# Patient Record
Sex: FEMALE
[De-identification: names, ages, dates, MRNs, and addresses within clinical notes are randomized per-mention and may not be internally consistent; named-entity substitution may affect disease eponyms.]

---

## 2017-10-15 ENCOUNTER — HEALTH MAINTENANCE LETTER (OUTPATIENT)
Age: 32
End: 2017-10-15

## 2019-04-17 ENCOUNTER — HOSPITAL ENCOUNTER (OUTPATIENT)
Dept: CARDIOLOGY | Facility: HOSPITAL | Age: 34
Discharge: HOME | End: 2019-04-17

## 2019-06-25 ENCOUNTER — OFFICE VISIT CONVERTED (OUTPATIENT)
Dept: CARDIOLOGY | Facility: CLINIC | Age: 34
End: 2019-06-25
Attending: SPECIALIST

## 2019-11-19 ENCOUNTER — HOSPITAL ENCOUNTER (OUTPATIENT)
Dept: URGENT CARE | Facility: CLINIC | Age: 34
Discharge: HOME | End: 2019-11-19
Attending: PHYSICIAN ASSISTANT

## 2020-02-18 ENCOUNTER — OFFICE VISIT CONVERTED (OUTPATIENT)
Dept: FAMILY MEDICINE CLINIC | Facility: CLINIC | Age: 35
End: 2020-02-18
Attending: NURSE PRACTITIONER

## 2020-03-20 ENCOUNTER — HOSPITAL ENCOUNTER (OUTPATIENT)
Dept: URGENT CARE | Facility: CLINIC | Age: 35
Discharge: HOME | End: 2020-03-20
Attending: PHYSICIAN ASSISTANT

## 2020-03-22 LAB — BACTERIA SPEC AEROBE CULT: NORMAL

## 2020-05-19 ENCOUNTER — HOSPITAL ENCOUNTER (OUTPATIENT)
Dept: LAB | Facility: HOSPITAL | Age: 35
Discharge: HOME | End: 2020-05-19
Attending: NURSE PRACTITIONER

## 2020-05-19 ENCOUNTER — OFFICE VISIT CONVERTED (OUTPATIENT)
Dept: FAMILY MEDICINE CLINIC | Facility: CLINIC | Age: 35
End: 2020-05-19
Attending: NURSE PRACTITIONER

## 2020-05-19 LAB
ALBUMIN SERPL-MCNC: 3.9 G/DL (ref 3.5–5)
ALBUMIN/GLOB SERPL: 1.2 {RATIO} (ref 1.4–2.6)
ALP SERPL-CCNC: 57 U/L (ref 42–98)
ALT SERPL-CCNC: 23 U/L (ref 10–40)
ANION GAP SERPL CALC-SCNC: 18 MMOL/L (ref 8–19)
AST SERPL-CCNC: 18 U/L (ref 15–50)
BASOPHILS # BLD AUTO: 0.06 10*3/UL (ref 0–0.2)
BASOPHILS NFR BLD AUTO: 0.6 % (ref 0–3)
BILIRUB SERPL-MCNC: 0.16 MG/DL (ref 0.2–1.3)
BUN SERPL-MCNC: 13 MG/DL (ref 5–25)
BUN/CREAT SERPL: 17 {RATIO} (ref 6–20)
CALCIUM SERPL-MCNC: 9.1 MG/DL (ref 8.7–10.4)
CHLORIDE SERPL-SCNC: 102 MMOL/L (ref 99–111)
CHOLEST SERPL-MCNC: 195 MG/DL (ref 107–200)
CHOLEST/HDLC SERPL: 3.9 {RATIO} (ref 3–6)
CONV ABS IMM GRAN: 0.03 10*3/UL (ref 0–0.2)
CONV CO2: 21 MMOL/L (ref 22–32)
CONV IMMATURE GRAN: 0.3 % (ref 0–1.8)
CONV TOTAL PROTEIN: 7.1 G/DL (ref 6.3–8.2)
CREAT UR-MCNC: 0.75 MG/DL (ref 0.5–0.9)
DEPRECATED RDW RBC AUTO: 44.4 FL (ref 36.4–46.3)
EOSINOPHIL # BLD AUTO: 0.33 10*3/UL (ref 0–0.7)
EOSINOPHIL # BLD AUTO: 3.6 % (ref 0–7)
ERYTHROCYTE [DISTWIDTH] IN BLOOD BY AUTOMATED COUNT: 13.1 % (ref 11.7–14.4)
GFR SERPLBLD BASED ON 1.73 SQ M-ARVRAT: >60 ML/MIN/{1.73_M2}
GLOBULIN UR ELPH-MCNC: 3.2 G/DL (ref 2–3.5)
GLUCOSE SERPL-MCNC: 80 MG/DL (ref 65–99)
HCT VFR BLD AUTO: 43.7 % (ref 37–47)
HDLC SERPL-MCNC: 50 MG/DL (ref 40–60)
HGB BLD-MCNC: 14 G/DL (ref 12–16)
LDLC SERPL CALC-MCNC: 126 MG/DL (ref 70–100)
LYMPHOCYTES # BLD AUTO: 2.14 10*3/UL (ref 1–5)
LYMPHOCYTES NFR BLD AUTO: 23.1 % (ref 20–45)
MCH RBC QN AUTO: 29.5 PG (ref 27–31)
MCHC RBC AUTO-ENTMCNC: 32 G/DL (ref 33–37)
MCV RBC AUTO: 92.2 FL (ref 81–99)
MONOCYTES # BLD AUTO: 0.64 10*3/UL (ref 0.2–1.2)
MONOCYTES NFR BLD AUTO: 6.9 % (ref 3–10)
NEUTROPHILS # BLD AUTO: 6.05 10*3/UL (ref 2–8)
NEUTROPHILS NFR BLD AUTO: 65.5 % (ref 30–85)
NRBC CBCN: 0 % (ref 0–0.7)
OSMOLALITY SERPL CALC.SUM OF ELEC: 283 MOSM/KG (ref 273–304)
PLATELET # BLD AUTO: 282 10*3/UL (ref 130–400)
PMV BLD AUTO: 11.1 FL (ref 9.4–12.3)
POTASSIUM SERPL-SCNC: 3.9 MMOL/L (ref 3.5–5.3)
RBC # BLD AUTO: 4.74 10*6/UL (ref 4.2–5.4)
SODIUM SERPL-SCNC: 137 MMOL/L (ref 135–147)
TRIGL SERPL-MCNC: 96 MG/DL (ref 40–150)
TSH SERPL-ACNC: 1.89 M[IU]/L (ref 0.27–4.2)
VLDLC SERPL-MCNC: 19 MG/DL (ref 5–37)
WBC # BLD AUTO: 9.25 10*3/UL (ref 4.8–10.8)

## 2020-08-19 ENCOUNTER — OFFICE VISIT CONVERTED (OUTPATIENT)
Dept: FAMILY MEDICINE CLINIC | Facility: CLINIC | Age: 35
End: 2020-08-19
Attending: NURSE PRACTITIONER

## 2020-11-18 ENCOUNTER — OFFICE VISIT CONVERTED (OUTPATIENT)
Dept: FAMILY MEDICINE CLINIC | Facility: CLINIC | Age: 35
End: 2020-11-18
Attending: NURSE PRACTITIONER

## 2021-02-17 ENCOUNTER — OFFICE VISIT CONVERTED (OUTPATIENT)
Dept: FAMILY MEDICINE CLINIC | Facility: CLINIC | Age: 36
End: 2021-02-17
Attending: NURSE PRACTITIONER

## 2021-02-17 ENCOUNTER — CONVERSION ENCOUNTER (OUTPATIENT)
Dept: FAMILY MEDICINE CLINIC | Facility: CLINIC | Age: 36
End: 2021-02-17

## 2021-02-17 LAB
AMPHET UR QL CFM: POSITIVE
BARBITURATES UR QL: NEGATIVE
BENZODIAZ UR QL SCN: NEGATIVE
CONV AMP/METHAMP UR: NEGATIVE
CONV COCAINE, UR: NEGATIVE
MDMA UR QL SCN: NEGATIVE
METHADONE UR QL SCN: NEGATIVE
OPIATES UR QL SCN: NEGATIVE
OXYCODONE UR QL SCN: NEGATIVE
PCP UR QL: NEGATIVE
THC SERPLBLD CFM-MCNC: NEGATIVE NG/ML

## 2021-05-13 NOTE — PROGRESS NOTES
Progress Note      Patient Name: Laura Ordaz   Patient ID: [id number]   Sex: Female   YOB: 1985    Primary Care Provider: Leny MAJOR   Referring Provider: Leny MAJOR    Visit Date: August 19, 2020    Provider: MORIAH Hanna   Location: [facility]   Location Address: [address] [place] [address], [address]  [place]  [id number]   Location Phone: [phone]          Chief Complaint  · Follow up - ADHD, Anxiety      History Of Present Illness  Laura Ordaz is a 35 year old /White female who presents for evaluation and treatment of:      Routine 3 month follow up - ADHD, Anxiety for medication refills.    ADHD:  Takes Adderall with good control of symptoms.  Patient states most days she does very well on current dose.  She does feel like she needs a little extra on some days d/t her job. 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 She has alot of stress with taking care of her patients. She also just started classes at [employer] for her ADN in nursing.     Anxiety:  Takes Vistaril with good control of symptoms.    UDS:  5/19/20  Taiwo:  7/28/20       Past Medical History  Disease Name Date Onset Notes   Allergic rhinitis, chronic --  --    Asthma --  --    Mood disorder --  --          Past Surgical History  Procedure Name Date Notes   Cyst Removal --  --    [place] Tooth Extraction --  --          Medication List  Name Date Started Instructions   dextroamphetamine-amphetamine 30 mg oral tablet 08/19/2020 take 1 tablet (30 mg) by oral route 2 times per day before breakfast and at 2 pm for 30 days   naproxen 250 mg oral tablet  take 1 tablet (250 mg) by oral route every 6 hours as needed with food   Sprintec (28) 0.25-35 mg-mcg oral tablet 08/19/2020 take 1 tablet by oral route once daily for 30 days   trazodone 50 mg oral tablet  take 1 tablet (50 mg) by oral route once daily at bedtime   Vistaril 50 mg oral capsule 02/18/2020 one po daily as  "needed         Allergy List  Allergen Name Date Reaction Notes   Nickel allergy --  --  --    NO KNOWN DRUG ALLERGIES --  --  --        Allergies Reconciled  Family Medical History  Disease Name Relative/Age Notes   Family history of colon cancer  --    Family history of breast cancer  --    Family history of lung cancer  --    Family history of heart disease  --    Family history of hypertension  --    Family History of Leukemia  --    Family history of diabetes mellitus (DM)  --    Family history of Jordyn Gehrig's disease  --          Social History  Finding Status Start/Stop Quantity Notes   Alcohol Current some day --/-- --  drinks weekly; wine and beer   Caffeine Current every day --/-- --  drinks regularly; coffee, tea and soft drinks; 1-2 times per day   Second hand smoke exposure Current some day --/-- --  yes   Tobacco Former 11/27 1 ppd --          Immunizations  NameDate Admin Mfg Trade Name Lot Number Route Inj VIS Given VIS Publication   [id number]/10/2019 SKB Fluzone Quadrivalent  NE NE 08/19/2020    Comments:          Review of Systems  · Constitutional  o Denies  o : fatigue  · Eyes  o Denies  o : blurred vision, changes in vision  · HENT  o Denies  o : headaches  · Cardiovascular  o Denies  o : chest pain, irregular heart beats, rapid heart rate, dyspnea on exertion  · Respiratory  o Denies  o : shortness of breath, wheezing, cough  · Gastrointestinal  o Denies  o : nausea, vomiting, diarrhea, constipation, abdominal pain, blood in stools, melena  · Genitourinary  o Denies  o : frequency, dysuria, hematuria  · Integument  o Denies  o : rash, new skin lesions  · Musculoskeletal  o Denies  o : joint pain, joint swelling, muscle pain  · Endocrine  o Denies  o : polyuria, polydipsia      Vitals  Date Time BP Position Site L\R Cuff Size HR RR TEMP (F) WT  HT  BMI kg/m2 BSA m2 O2 Sat HC       02/18/2020 12:52 /71 Sitting    99 - R   221lbs 0oz 5'  9\" 32.64 2.21 97 %    05/19/2020 08:59 /89 " "Sitting    106 - R  98.1 228lbs 0oz 5'  9\" 33.67 2.24 99 %    08/19/2020 09:34 /78 Sitting    93 - R  97.7 229lbs 0oz 5'  9\" 33.82 2.25 100 %          Physical Examination  · Constitutional  o Appearance  o : well developed, well-nourished, no acute distress  · Head and Face  o Head  o : normocephalic, atraumatic  · Neck  o Inspection/Palpation  o : normal appearance, no masses or tenderness, trachea midline  o Thyroid  o : gland size normal, nontender, no nodules or masses present on palpation  · Respiratory  o Respiratory Effort  o : breathing unlabored  o Inspection of Chest  o : chest rise symmetric bilaterally  o Auscultation of Lungs  o : clear to auscultation bilaterally throughout inspiration and expiration  · Cardiovascular  o Heart  o :   § Auscultation of Heart  § : regular rate and rhythm, no murmurs, gallops or rubs  o Peripheral Vascular System  o :   § Extremities  § : no edema  · Lymphatic  o Neck  o : no cervical lymphadenopathy, no supraclavicular lymphadenopathy  · Psychiatric  o Mood and Affect  o : mood normal, affect appropriate          Results  · In-Office Procedures  o Lab procedure  § IOP - Urine Drug Screen In-House [facility] ([address]383)   § Amphetamines Ur Ql: Positive   § Barbiturates Ur Ql: Negative   § Buprenorphine+Nor Ur Ql Scn: Negative   § Benzodiaz Ur Ql: Negative   § Cocaine Ur Ql: Negative   § Methadone Ur Ql: Negative   § Methamphet Ur Ql: Negative   § MDMA Ur Ql Scn: Negative   § Opiates Ur Ql: Negative   § Oxycodone Ur Ql: Negative   § PCP Ur Ql: Negative   § THC Ur Ql: Negative   § Temp in Range?: Within/Acceptable   § Control Seen?: Yes   § Pregnancy test, urine (93559)   § B-HCG Ur Ql: Negative   § Internal Control Verified?: Yes       Assessment  · ADHD (attention deficit hyperactivity disorder)     314.01/F90.9  · Anxiety     300.00/F41.9  · Medication management     V58.69/Z79.899  · Contraceptive management     V25.9/Z30.9    Problems Reconciled  Plan  · Orders  o HERBIE " Report (KASPR) - - 08/19/2020  o ACO-39: Current medications updated and reviewed () - - 08/19/2020  o ACO-14: Influenza immunization administered or previously received () - - 08/19/2020  · Medications  o dextroamphetamine-amphetamine 30 mg oral tablet   SIG: take 1 tablet (30 mg) by oral route 2 times per day before breakfast and at 2 pm for 30 days   DISP: (60) tablet with 0 refills  Refilled on 08/19/2020     o Sprintec (28) 0.25-35 mg-mcg oral tablet   SIG: take 1 tablet by oral route once daily for 30 days   DISP: (3) tablet with 1 refills  Refilled on 08/19/2020     o Medications have been Reconciled  o Transition of Care or Provider Policy  · Instructions  o Obtained a written consent for HERBIE query. Discussed the risk and benefits of the use of controlled substances with the patient, including the risk of tolerance and drug dependence. The patient has been counseled on the need to have an exit strategy, including potentially discontinuing the use of controlled substances. HERBIE has or will be reviewed as soon as it becomes avaliable.  o Patient is taking medications as prescribed and doing well.   o Patient was educated/instructed on their diagnosis, treatment and medications prior to discharge from the clinic today.  o Electronically Identified Patient Education Materials Provided Electronically  · Disposition  o Follow Up in 3 months            Electronically Signed by: MORIAH Hanna -Author on August 20, 2020 07:23:15 PM  Electronically Co-signed by: Sg Cameron DO -Reviewer on August 24, 2020 05:05:37 PM

## 2021-05-13 NOTE — PROGRESS NOTES
Progress Note      Patient Name: Laura Ordaz   Patient ID: [id number]   Sex: Female   YOB: 1985    Primary Care Provider: Leny MAJOR   Referring Provider: Leny MAJOR    Visit Date: May 19, 2020    Provider: MORIAH Hanna   [facility]   Location Address: [address] [place] [address], [address]  [place], [place]  [id number]   Location Phone: [phone]          Chief Complaint  · Pt here for 3 month f/u   · Medication refills  · Annual Physical Exam      History Of Present Illness  Laura Ordaz is a 35 year old /White female who presents for evaluation and treatment of:      Annual Physical Exam    ADD, contraceptive managment    pt here requesting medication refills on  dextroamphetamine-amphetamine  and sprintec.  Immunizations UTD per patient.       Past Medical History  Disease Name Date Onset Notes   Allergic rhinitis, chronic --  --    Asthma --  --    Mood disorder --  --          Past Surgical History  Procedure Name Date Notes   Cyst Removal --  --    [place] Tooth Extraction --  --          Medication List  Name Date Started Instructions   dextroamphetamine-amphetamine 30 mg oral tablet 05/19/2020 take 1 tablet (30 mg) by oral route 2 times per day before breakfast and at 2 pm   naproxen 250 mg oral tablet  take 1 tablet (250 mg) by oral route every 6 hours as needed with food   Sprintec (28) 0.25-35 mg-mcg oral tablet 05/19/2020 take 1 tablet by oral route once daily for 30 days   trazodone 50 mg oral tablet  take 1 tablet (50 mg) by oral route once daily at bedtime   Vistaril 50 mg oral capsule 02/18/2020 one po daily as needed         Allergy List  Allergen Name Date Reaction Notes   Nickel allergy --  --  --    NO KNOWN DRUG ALLERGIES --  --  --          Family Medical History  Disease Name Relative/Age Notes   Family history of colon cancer  --    Family history of breast cancer  --    Family history of lung cancer  --    Family history of heart disease   "--    Family history of hypertension  --    Family History of Leukemia  --    Family history of diabetes mellitus (DM)  --    Family history of Jordyn Gehrig's disease  --          Social History  Finding Status Start/Stop Quantity Notes   Alcohol Current some day --/-- --  drinks weekly; wine and beer   Caffeine Current every day --/-- --  drinks regularly; coffee, tea and soft drinks; 1-2 times per day   Second hand smoke exposure Current some day --/-- --  yes   Tobacco Former 11/27 --  started smoking at age 10; quit smoking at age 26; smoked 15 cigarette(s) per day         Review of Systems  · Constitutional  o Denies  o : fever, fatigue, weight loss, weight gain  · Cardiovascular  o Denies  o : lower extremity edema, claudication, chest pressure, palpitations  · Respiratory  o Denies  o : shortness of breath, wheezing, cough, hemoptysis, dyspnea on exertion  · Gastrointestinal  o Denies  o : nausea, vomiting, diarrhea, constipation, abdominal pain      Vitals  Date Time BP Position Site L\R Cuff Size HR RR TEMP (F) WT  HT  BMI kg/m2 BSA m2 O2 Sat        05/19/2020 08:59 /89 Sitting    106 - R  98.1 228lbs 0oz 5'  9\" 33.67 2.24 99 %          Physical Examination  · Constitutional  o Appearance  o : well developed, well-nourished, no acute distress  · Head and Face  o Head  o : normocephalic, atraumatic  · Neck  o Inspection/Palpation  o : normal appearance, no masses or tenderness, trachea midline  o Thyroid  o : gland size normal, nontender, no nodules or masses present on palpation  · Respiratory  o Respiratory Effort  o : breathing unlabored  o Inspection of Chest  o : chest rise symmetric bilaterally  o Auscultation of Lungs  o : clear to auscultation bilaterally throughout inspiration and expiration  · Cardiovascular  o Heart  o :   § Auscultation of Heart  § : tachycardia present, normal rhythm, no murmurs present, no pericardial friction rub  o Peripheral Vascular System  o :   § Extremities  § : no " edema  · Lymphatic  o Neck  o : no cervical lymphadenopathy, no supraclavicular lymphadenopathy  · Psychiatric  o Mood and Affect  o : mood normal, affect appropriate          Results  · In-Office Procedures  o Lab procedure  § IOP - Urine Drug Screen In-House [facility] ([id number])   § Amphetamines Ur Ql: Positive   § Barbiturates Ur Ql: Negative   § Buprenorphine+Nor Ur Ql Scn: Negative   § Benzodiaz Ur Ql: Negative   § Cocaine Ur Ql: Negative   § Methadone Ur Ql: Negative   § Methamphet Ur Ql: Negative   § MDMA Ur Ql Scn: Negative   § Opiates Ur Ql: Negative   § Oxycodone Ur Ql: Negative   § PCP Ur Ql: Negative   § THC Ur Ql: Negative   § Temp in Range?: Within/Acceptable   § Control Seen?: Yes       Assessment  · Annual physical exam     V70.0/Z00.00  · Screening for lipid disorders     V77.91/Z13.220  · ADD (attention deficit disorder)     314.00/F98.8  continue Adderall 30mg bid. pt doing well on this dose  · Family planning counseling     V25.09/Z30.09  · Contraceptive management     V25.9/Z30.9  refill Sprintec, doing well.  · Routine lab draw     V72.60/Z01.89  · Medication management     V58.69/Z79.899    Problems Reconciled  Plan  · Orders  o Physical, Primary Care Panel (CBC, CMP, Lipid, TSH) [facility] (18690, 95456, 84171, 46661) - V70.0/Z00.00 - 05/19/2020  o HERBIE Report (KASPR) - - 05/19/2020  o ACO-39: Current medications updated and reviewed () - - 05/19/2020  o ACO-14: Influenza immunization administered or previously received () - - 05/19/2020  o ACO-13: Fall Risk Screening with no falls in past year or only one fall without injury in the past year (1101F) - - 05/19/2020  o ACO - Pt declines to or was not able to provide an Advance Care Plan or name a Surrogate Decision Maker (1124F) - - 05/19/2020  · Medications  o Sprintec (28) 0.25-35 mg-mcg oral tablet   SIG: take 1 tablet by oral route once daily for 30 days   DISP: (1) tablet with 11 refills  Refilled on 05/19/2020     o Medications have been  Reconciled  o Transition of Care or Provider Policy  · Instructions  o Reviewed health maintenance flowsheet and updated information. Orders were placed and/or patient's response was documented.  o Obtained a written consent for HERBIE query. Discussed the risk and benefits of the use of controlled substances with the patient, including the risk of tolerance and drug dependence. The patient has been counseled on the need to have an exit strategy, including potentially discontinuing the use of controlled substances. HERBIE has or will be reviewed as soon as it becomes avaliable.  o Patient is taking medications as prescribed and doing well.   o Patient was educated/instructed on their diagnosis, treatment and medications prior to discharge from the clinic today.  o Call the office with any concerns or questions.  o Discussed Covid-19 precautions including, but not limited to, social distancing, avoid touching your face, and hand washing.   · Disposition  o Follow Up in 3 months  · Associate Tasks  o Task ID [id number] ''''Provider to Provider ONLY Controlled Substance Request: dextroamphetamine-amphetamine refill            Electronically Signed by: MORIAH Hanna -Author on May 19, 2020 05:37:11 PM

## 2021-05-13 NOTE — PROGRESS NOTES
Progress Note      Patient Name: Laura Ordaz   Patient ID: [id number]   Sex: Female   YOB: 1985    Primary Care Provider: Leny MAJOR   Referring Provider: Leny MAJOR    Visit Date: November 18, 2020    Provider: MORIAH Hanna   Location: [facility]   Location Address: [address] [place] [address], [address]  [place], [place]  [id number]   Location Phone: [phone]          Chief Complaint  · 3 mo follow up - ADD      History Of Present Illness  Laura Ordaz is a 35 year old /White female who presents for evaluation and treatment of:      Routine 3 month follow up on ADHD for  medication refill.    ADHD: Takes Adderall with good control of symptoms on current dose.  Patient is able to concentrate, stay focused and multi-task with medication. Pt not due for med refill at [facility] today, as she just refilled. She will call when she needs refill.     Taiwo: 11/17/20  UDS: 8/29/20       Past Medical History  Disease Name Date Onset Notes   Allergic rhinitis, chronic --  --    Asthma --  --    Mood disorder --  --          Past Surgical History  Procedure Name Date Notes   Cyst Removal --  --    [place] Tooth Extraction --  --          Medication List  Name Date Started Instructions   dextroamphetamine-amphetamine 30 mg oral tablet 11/06/2020 take 1 tablet (30 mg) by oral route 2 times per day before breakfast and at 2 pm for 30 days   naproxen 250 mg oral tablet  take 1 tablet (250 mg) by oral route every 6 hours as needed with food   Sprintec (28) 0.25-35 mg-mcg oral tablet 08/19/2020 take 1 tablet by oral route once daily for 30 days   trazodone 50 mg oral tablet  take 1 tablet (50 mg) by oral route once daily at bedtime   Vistaril 50 mg oral capsule 02/18/2020 one po daily as needed         Allergy List  Allergen Name Date Reaction Notes   Nickel allergy --  --  --    NO KNOWN DRUG ALLERGIES --  --  --        Allergies Reconciled  Family Medical History  Disease  "Name Relative/Age Notes   Family history of colon cancer  --    Family history of breast cancer  --    Family history of lung cancer  --    Family history of heart disease  --    Family history of hypertension  --    Family History of Leukemia  --    Family history of diabetes mellitus (DM)  --    Family history of Jordyn Gehrig's disease  --          Social History  Finding Status Start/Stop Quantity Notes   Alcohol Current some day --/-- --  drinks weekly; wine and beer   Caffeine Current every day --/-- --  drinks regularly; coffee, tea and soft drinks; 1-2 times per day   Second hand smoke exposure Current some day --/-- --  yes   Tobacco Former 11/27 1 ppd --          Immunizations  NameDate Admin Mfg Trade Name Lot Number Route Inj VIS Given VIS Publication   [id number]/10/2020 SKB Fluzone Quadrivalent  NE NE 11/18/2020    Comments:          Review of Systems  · Constitutional  o Denies  o : fatigue  · Eyes  o Denies  o : blurred vision, changes in vision  · HENT  o Denies  o : headaches  · Cardiovascular  o Denies  o : chest pain, irregular heart beats, rapid heart rate, dyspnea on exertion  · Respiratory  o Denies  o : shortness of breath, wheezing, cough  · Gastrointestinal  o Denies  o : nausea, vomiting, diarrhea, constipation, abdominal pain, blood in stools, melena  · Genitourinary  o Denies  o : frequency, dysuria, hematuria  · Integument  o Denies  o : rash, new skin lesions  · Musculoskeletal  o Denies  o : joint pain, joint swelling, muscle pain  · Endocrine  o Denies  o : polyuria, polydipsia      Vitals  Date Time BP Position Site L\R Cuff Size HR RR TEMP (F) WT  HT  BMI kg/m2 BSA m2 O2 Sat FR L/min FiO2 HC       05/19/2020 08:59 /89 Sitting    106 - R  98.1 228lbs 0oz 5'  9\" 33.67 2.24 99 %      08/19/2020 09:34 /78 Sitting    93 - R  97.7 229lbs 0oz 5'  9\" 33.82 2.25 100 %  21%    11/18/2020 12:52 /56 Sitting    94 - R  97.8 236lbs 4oz 5'  9\" 34.89 2.28 100 %  21%  "         Physical Examination  · Constitutional  o Appearance  o : well developed, well-nourished, no acute distress  · Head and Face  o Head  o : normocephalic, atraumatic  · Respiratory  o Respiratory Effort  o : breathing unlabored  o Inspection of Chest  o : chest rise symmetric bilaterally  o Auscultation of Lungs  o : clear to auscultation bilaterally throughout inspiration and expiration  · Cardiovascular  o Heart  o :   § Auscultation of Heart  § : regular rate and rhythm, no murmurs, gallops or rubs  o Peripheral Vascular System  o :   § Extremities  § : no edema  · Psychiatric  o Mood and Affect  o : mood normal, affect appropriate          Results  · In-Office Procedures  o Lab procedure  § IOP - Urine Drug Screen In-House [facility] ([address])   § Amphetamines Ur Ql: Positive   § Barbiturates Ur Ql: Negative   § Buprenorphine+Nor Ur Ql Scn: Negative   § Benzodiaz Ur Ql: Negative   § Cocaine Ur Ql: Negative   § Methadone Ur Ql: Negative   § Methamphet Ur Ql: Negative   § MDMA Ur Ql Scn: Negative   § Opiates Ur Ql: Negative   § Oxycodone Ur Ql: Negative   § PCP Ur Ql: Negative   § THC Ur Ql: Negative   § Temp in Range?: Within/Acceptable   § Control Seen?: Yes       Assessment  · ADHD (attention deficit hyperactivity disorder)     314.01/F90.9    Problems Reconciled  Plan  · Orders  o HERBIE Report (KASPR) - - 11/18/2020  o ACO-39: Current medications updated and reviewed (, 1159F) - - 11/18/2020  o ACO-14: Influenza immunization administered or previously received [facility] () - - 11/18/2020  · Medications  o Medications have been Reconciled  o Transition of Care or Provider Policy  · Instructions  o Obtained a written consent for HERBIE query. Discussed the risk and benefits of the use of controlled substances with the patient, including the risk of tolerance and drug dependence. The patient has been counseled on the need to have an exit strategy, including potentially discontinuing the use of controlled  substances. HERBIE has or will be reviewed as soon as it becomes avaliable.  o See note for indication of controlled substance. Herbie and drug screen have been reviewed. Controlled substance agreement signed and scanned into chart. After discussion of risks and benefits of medication, I have determined patient is suitable for Rx while demonstrating the ability to safely follow and administer the medication plan. Patient understands the expectation that medication directions cannot be adjusted without a provider's written approval.   o Patient was educated/instructed on their diagnosis, treatment and medications prior to discharge from the clinic today.  o Call the office with any concerns or questions.  o Discussed Covid-19 precautions including, but not limited to, social distancing, avoid touching your face, and hand washing.   o Electronically Identified Patient Education Materials Provided Electronically  · Disposition  o Follow Up in 3 months            Electronically Signed by: MORIAH Hanna -Author on November 18, 2020 02:09:05 PM

## 2021-05-14 VITALS
DIASTOLIC BLOOD PRESSURE: 72 MMHG | BODY MASS INDEX: 34.66 KG/M2 | SYSTOLIC BLOOD PRESSURE: 120 MMHG | TEMPERATURE: 98.2 F | HEIGHT: 69 IN | WEIGHT: 234 LBS | HEART RATE: 109 BPM | OXYGEN SATURATION: 100 %

## 2021-05-14 VITALS
HEIGHT: 69 IN | OXYGEN SATURATION: 100 % | HEART RATE: 94 BPM | DIASTOLIC BLOOD PRESSURE: 56 MMHG | WEIGHT: 236.25 LBS | TEMPERATURE: 97.8 F | SYSTOLIC BLOOD PRESSURE: 116 MMHG | BODY MASS INDEX: 34.99 KG/M2

## 2021-05-14 NOTE — PROGRESS NOTES
Progress Note      Patient Name: Laura Ordaz   Patient ID: [id number]   Sex: Female   YOB: 1985    Primary Care Provider: Leny MAJOR   Referring Provider: Leny MAJOR    Visit Date: February 17, 2021    Provider: MORIAH Hanna   Location: [facility]   Location Address: [address] [place] [address], [address]  [place], [place]  [id number]   Location Phone: [phone]          Chief Complaint  · Follow up ADD      History Of Present Illness  Laura Ordaz is a 35 year old /White female who presents for evaluation and treatment of:      ADD    Patient states that she is here today for a refill on her Adderall for her ADD. Patient states that she feels like the medicine is losing its strength but knows that she can't go any higher due to her insurance not paying of the extended release. She is currently taking 30mg BID. She does not wish to change dosing at this time. She will continue this dose and contact her insurance and see if the XR formula will be covered.            Past Medical History  Disease Name Date Onset Notes   Allergic rhinitis, chronic --  --    Asthma --  --    Mood disorder --  --          Past Surgical History  Procedure Name Date Notes   Cyst Removal --  --    [place] Tooth Extraction --  --          Medication List  Name Date Started Instructions   dextroamphetamine-amphetamine 30 mg oral tablet 01/15/2021 take 1 tablet (30 mg) by oral route 2 times per day before breakfast and at 2 pm for 30 days   naproxen 250 mg oral tablet  take 1 tablet (250 mg) by oral route every 6 hours as needed with food   Sprintec (28) 0.25-35 mg-mcg oral tablet 01/15/2021 take 1 tablet by oral route once daily for 90 days   trazodone 50 mg oral tablet  take 1 tablet (50 mg) by oral route once daily at bedtime   Vistaril 50 mg oral capsule 02/18/2020 one po daily as needed         Allergy List  Allergen Name Date Reaction Notes   Nickel allergy --  --  --    NO  "KNOWN DRUG ALLERGIES --  --  --        Allergies Reconciled  Family Medical History  Disease Name Relative/Age Notes   Family history of colon cancer  --    Family history of breast cancer  --    Family history of lung cancer  --    Family history of heart disease  --    Family history of hypertension  --    Family History of Leukemia  --    Family history of diabetes mellitus (DM)  --    Family history of Jordyn Gehrig's disease  --          Social History  Finding Status Start/Stop Quantity Notes   Alcohol Current some day --/-- --  drinks weekly; wine and beer   Caffeine Current every day --/-- --  drinks regularly; coffee, tea and soft drinks; 1-2 times per day   Second hand smoke exposure Current some day --/-- --  yes   Tobacco Former 11/27 1 ppd --          Immunizations  NameDate Admin Mfg Trade Name Lot Number Route Inj VIS Given VIS Publication   Rquwhihag92/10/2020 SKB Fluzone Quadrivalent  NE NE 11/18/2020    Comments:          Review of Systems  · Constitutional  o Denies  o : fatigue  · Eyes  o Denies  o : blurred vision, changes in vision  · HENT  o Denies  o : headaches  · Cardiovascular  o Denies  o : chest pain, irregular heart beats, rapid heart rate, dyspnea on exertion  · Respiratory  o Denies  o : shortness of breath, wheezing, cough  · Gastrointestinal  o Denies  o : nausea, vomiting, diarrhea, constipation, abdominal pain, blood in stools, melena  · Genitourinary  o Denies  o : frequency, dysuria, hematuria  · Integument  o Denies  o : rash, new skin lesions  · Musculoskeletal  o Denies  o : joint pain, joint swelling, muscle pain  · Endocrine  o Denies  o : polyuria, polydipsia      Vitals  Date Time BP Position Site L\R Cuff Size HR RR TEMP (F) WT  HT  BMI kg/m2 BSA m2 O2 Sat FR L/min FiO2 HC       02/17/2021 12:47 /72 Sitting    109 - R  98.2 233lbs 16oz 5'  9\" 34.56 2.27 100 %  21%          Physical Examination  · Constitutional  o Appearance  o : well developed, well-nourished, no " acute distress  · Head and Face  o Head  o : normocephalic, atraumatic  · Respiratory  o Respiratory Effort  o : breathing unlabored  o Inspection of Chest  o : chest rise symmetric bilaterally  o Auscultation of Lungs  o : clear to auscultation bilaterally throughout inspiration and expiration  · Cardiovascular  o Heart  o :   § Auscultation of Heart  § : regular rate and rhythm, no murmurs, gallops or rubs  o Peripheral Vascular System  o :   § Extremities  § : no edema  · Psychiatric  o Mood and Affect  o : mood normal, affect appropriate          Results  · In-Office Procedures  o Lab procedure  § IOP - Urine Drug Screen In-House [facility] ([address])   § Amphetamines Ur Ql: Positive   § Barbiturates Ur Ql: Negative   § Buprenorphine+Nor Ur Ql Scn: Negative   § Benzodiaz Ur Ql: Negative   § Cocaine Ur Ql: Negative   § Methadone Ur Ql: Negative   § Methamphet Ur Ql: Negative   § MDMA Ur Ql Scn: Negative   § Opiates Ur Ql: Negative   § Oxycodone Ur Ql: Negative   § PCP Ur Ql: Negative   § THC Ur Ql: Negative   § Temp in Range?: Within/Acceptable   § Control Seen?: Yes       Assessment  · Other long term (current) drug therapy     V58.69/Z79.899  · Screening for depression     V79.0/Z13.89  · ADD (attention deficit disorder)     314.00/F98.8    Problems Reconciled  Plan  · Orders  o Annual depression screening, 15 minutes (, 97554) - V79.0/Z13.89 - 02/17/2021  o ACO-18: Negative screen for clinical depression using a standardized tool () - V79.0/Z13.89 - 02/17/2021  o HERBIE Report (KASPR) - V58.69/Z79.899 - 02/17/2021  o ACO-39: Current medications updated and reviewed (1159F, ) - - 02/17/2021  · Medications  o Medications have been Reconciled  o Transition of Care or Provider Policy  · Instructions  o Depression Screen completed and scanned into the EMR under the designated folder within the patient's documents.  o Today's PHQ-9 result is _1__  o Obtained a written consent for HERBIE query. Discussed the  risk and benefits of the use of controlled substances with the patient, including the risk of tolerance and drug dependence. The patient has been counseled on the need to have an exit strategy, including potentially discontinuing the use of controlled substances. HERBIE has or will be reviewed as soon as it becomes avaliable.  o See note for indication of controlled substance. Herbie and drug screen have been reviewed. Controlled substance agreement signed and scanned into chart. After discussion of risks and benefits of medication, I have determined patient is suitable for Rx while demonstrating the ability to safely follow and administer the medication plan. Patient understands the expectation that medication directions cannot be adjusted without a provider's written approval.   o Patient is taking medications as prescribed and doing well.   o Patient was educated/instructed on their diagnosis, treatment and medications prior to discharge from the clinic today.  o Call the office with any concerns or questions.  o Discussed Covid-19 precautions including, but not limited to, social distancing, avoid touching your face, and hand washing.   o Electronically Identified Patient Education Materials Provided Electronically  · Disposition  o Follow Up in 3 months  · Associate Tasks  o Task ID [id number] ''''Provider to Provider ONLY Controlled Substance Request: Adderall rf            Electronically Signed by: MORIAH Hanna -Author on February 17, 2021 01:37:07 PM

## 2021-05-15 VITALS
HEART RATE: 106 BPM | WEIGHT: 228 LBS | BODY MASS INDEX: 33.77 KG/M2 | OXYGEN SATURATION: 99 % | TEMPERATURE: 98.1 F | DIASTOLIC BLOOD PRESSURE: 89 MMHG | SYSTOLIC BLOOD PRESSURE: 127 MMHG | HEIGHT: 69 IN

## 2021-05-15 VITALS
BODY MASS INDEX: 31.99 KG/M2 | SYSTOLIC BLOOD PRESSURE: 122 MMHG | HEART RATE: 100 BPM | DIASTOLIC BLOOD PRESSURE: 88 MMHG | WEIGHT: 216 LBS | HEIGHT: 69 IN

## 2021-05-15 VITALS
TEMPERATURE: 97.7 F | DIASTOLIC BLOOD PRESSURE: 78 MMHG | WEIGHT: 229 LBS | BODY MASS INDEX: 33.92 KG/M2 | OXYGEN SATURATION: 100 % | HEART RATE: 93 BPM | SYSTOLIC BLOOD PRESSURE: 130 MMHG | HEIGHT: 69 IN

## 2021-05-15 VITALS
WEIGHT: 221 LBS | SYSTOLIC BLOOD PRESSURE: 119 MMHG | OXYGEN SATURATION: 97 % | HEIGHT: 69 IN | BODY MASS INDEX: 32.73 KG/M2 | DIASTOLIC BLOOD PRESSURE: 71 MMHG | HEART RATE: 99 BPM

## 2021-05-17 ENCOUNTER — CONVERSION ENCOUNTER (OUTPATIENT)
Dept: FAMILY MEDICINE CLINIC | Facility: CLINIC | Age: 36
End: 2021-05-17

## 2021-05-17 ENCOUNTER — OFFICE VISIT CONVERTED (OUTPATIENT)
Dept: FAMILY MEDICINE CLINIC | Facility: CLINIC | Age: 36
End: 2021-05-17
Attending: NURSE PRACTITIONER

## 2021-05-26 ENCOUNTER — RECORDS - HEALTHEAST (OUTPATIENT)
Dept: ADMINISTRATIVE | Facility: CLINIC | Age: 36
End: 2021-05-26

## 2021-06-05 NOTE — PROGRESS NOTES
Progress Note      Patient Name: Laura Ordaz   Patient ID: [id number]   Sex: Female   YOB: 1985    Primary Care Provider: Leny MAJOR   Referring Provider: Leny MAJOR    Visit Date: May 17, 2021    Provider: MORIAH Hanna   Location: [facility]   Location Address: [address] [place] [address], [address]  [place]  [id number]   Location Phone: [phone]          Chief Complaint  · Follow up - ADD, Insomnia  · annual physical exam      History Of Present Illness  Laura Ordaz is a 36 year old /White female who presents for evaluation and treatment of:      Annual Physical Exam  Routine follow up on ADD, Insomnia for medication refills.    ADD:  Takes Adderal with good control of symptoms.  Patient is able to concentrate and focus on tasks.  Patient does note that it is not working as well as it did, she feels it wearing off at about the assisted [place] before she can take her second dose.  She has been on her current dose approx. 3 years and is interested in possibly going to Adderall XR. She has contacted her insurance to see if they will cover the XR; however she has not heard back from them. For now, she would just like to stay with her current dose, as she does not want to go without the medication.    Taiwo 5/17/21  UDS 5/17/21    Insomnia/Night terrors:  Takes Trazodone, Vistaril PRN with good control of symptoms.  Patient states she is able to fall asleep quickly and stays asleep all night.  She does not remember having night terrors when she wakes up.         Past Medical History  Disease Name Date Onset Notes   Allergic rhinitis, chronic --  --    Asthma --  --    Mood disorder --  --          Past Surgical History  Procedure Name Date Notes   Cyst Removal --  --    [place] Tooth Extraction --  --          Medication List  Name Date Started Instructions   dextroamphetamine-amphetamine 30 mg oral tablet 04/12/2021 take 1 tablet (30 mg) by oral  route 2 times per day before breakfast and at 2 pm for 30 days   naproxen 250 mg oral tablet  take 1 tablet (250 mg) by oral route every 6 hours as needed with food   Sprintec (28) 0.25-35 mg-mcg oral tablet 01/15/2021 take 1 tablet by oral route once daily for 90 days   trazodone 50 mg oral tablet  take 1 tablet (50 mg) by oral route once daily at bedtime   Vistaril 50 mg oral capsule 02/18/2020 one po daily as needed         Allergy List  Allergen Name Date Reaction Notes   Nickel allergy --  --  --    NO KNOWN DRUG ALLERGIES --  --  --        Allergies Reconciled  Family Medical History  Disease Name Relative/Age Notes   Family history of colon cancer  --    Family history of breast cancer  --    Family history of lung cancer  --    Family history of heart disease  --    Family history of hypertension  --    Family History of Leukemia  --    Family history of diabetes mellitus (DM)  --    Family history of Jordyn Gehrig's disease  --          Social History  Finding Status Start/Stop Quantity Notes   Alcohol Current some day --/-- --  drinks weekly; wine and beer   Caffeine Current every day --/-- --  drinks regularly; coffee, tea and soft drinks; 1-2 times per day   Second hand smoke exposure Current some day --/-- --  yes   Tobacco Former 11/27 1 ppd --          Immunizations  NameDate Admin Mfg Trade Name Lot Number Route Inj VIS Given VIS Publication   Lcihmdygf06/10/2020 SKB Fluzone Quadrivalent  NE NE 11/18/2020    Comments:          Review of Systems  · Constitutional  o Denies  o : fatigue  · Eyes  o Denies  o : blurred vision, changes in vision  · HENT  o Denies  o : headaches  · Cardiovascular  o Denies  o : chest pain, irregular heart beats, rapid heart rate, dyspnea on exertion  · Respiratory  o Denies  o : shortness of breath, wheezing, cough  · Gastrointestinal  o Denies  o : nausea, vomiting, diarrhea, constipation, abdominal pain, blood in stools, melena  · Genitourinary  o Denies  o : frequency,  "dysuria, hematuria  · Integument  o Denies  o : rash, new skin lesions  · Musculoskeletal  o Denies  o : joint pain, joint swelling, muscle pain  · Endocrine  o Denies  o : polyuria, polydipsia      Vitals  Date Time BP Position Site L\R Cuff Size HR RR TEMP (F) WT  HT  BMI kg/m2 BSA m2 O2 Sat FR L/min FiO2 HC       11/18/2020 12:52 /56 Sitting    94 - R  97.8 236lbs 4oz 5'  9\" 34.89 2.28 100 %  21%    02/17/2021 12:47 /72 Sitting    109 - R  98.2 233lbs 16oz 5'  9\" 34.56 2.27 100 %  21%    05/17/2021 11:04 /77 Sitting    94 - R  97.8 243lbs 2oz 5'  9\" 35.9 2.32 100 %  21%          Physical Examination  · Constitutional  o Appearance  o : well developed, well-nourished, no acute distress  · Head and Face  o Head  o : normocephalic, atraumatic  · Respiratory  o Respiratory Effort  o : breathing unlabored  o Inspection of Chest  o : chest rise symmetric bilaterally  o Auscultation of Lungs  o : clear to auscultation bilaterally throughout inspiration and expiration  · Cardiovascular  o Heart  o :   § Auscultation of Heart  § : regular rate and rhythm, no murmurs, gallops or rubs  o Peripheral Vascular System  o :   § Extremities  § : no edema  · Psychiatric  o Mood and Affect  o : mood normal, affect appropriate          Results  · In-Office Procedures  o Lab procedure  § IOP - Urine Drug Screen In-House [facility] ([address])   § Amphetamines Ur Ql: Positive   § Barbiturates Ur Ql: Negative   § Buprenorphine+Nor Ur Ql Scn: Negative   § Benzodiaz Ur Ql: Negative   § Cocaine Ur Ql: Negative   § Methadone Ur Ql: Negative   § Methamphet Ur Ql: Negative   § MDMA Ur Ql Scn: Negative   § Opiates Ur Ql: Negative   § Oxycodone Ur Ql: Negative   § PCP Ur Ql: Negative   § THC Ur Ql: Negative   § Temp in Range?: Within/Acceptable   § Control Seen?: Yes       Assessment  · Annual physical exam     V70.0/Z00.00  · Insomnia, unspecified     780.52/G47.00  · ADD (attention deficit disorder)     314.00/F98.8  · Night " felipe     307.46/F51.4    Problems Reconciled  Plan  · Orders  o Physical, Primary Care Panel (CBC, CMP, Lipid, TSH) [facility] (69452, 34763, 66531, 93962) - V70.0/Z00.00 - 05/17/2021  o HERBIE Report (KASPR) - - 05/17/2021  o ACO-14: Influenza immunization administered or previously received [facility] () - - 05/17/2021  o ACO-39: Current medications updated and reviewed (, 1159F) - - 05/17/2021  · Medications  o Medications have been Reconciled  o Transition of Care or Provider Policy  · Instructions  o Reviewed health maintenance flowsheet and updated information. Orders were placed and/or patient's response was documented.  o Avoid any electronic use for at least 30 minutes prior to bed time. Cell phone screens, tablets and TVs imitate daylight, so your brain can become confused on the time of day. No caffeine use in the late afternoon and evenings.  o Obtained a written consent for HERBIE query. Discussed the risk and benefits of the use of controlled substances with the patient, including the risk of tolerance and drug dependence. The patient has been counseled on the need to have an exit strategy, including potentially discontinuing the use of controlled substances. HERBEI has or will be reviewed as soon as it becomes avaliable.  o See note for indication of controlled substance. Herbie and drug screen have been reviewed. Controlled substance agreement signed and scanned into chart. After discussion of risks and benefits of medication, I have determined patient is suitable for Rx while demonstrating the ability to safely follow and administer the medication plan. Patient understands the expectation that medication directions cannot be adjusted without a provider's written approval.   o Patient is taking medications as prescribed and doing well.   o Patient was educated/instructed on their diagnosis, treatment and medications prior to discharge from the clinic today.  o Call the office with any concerns or  questions.  o Electronically Identified Patient Education Materials Provided Electronically  · Disposition  o Follow Up in 3 months  · Associate Tasks  o Task ID [id number] ''''Provider to Provider ONLY Controlled Substance Request: adderall refill            Electronically Signed by: MORIAH Hanna -Author on May 17, 2021 12:10:14 PM

## 2021-06-17 DIAGNOSIS — F90.0 ATTENTION DEFICIT HYPERACTIVITY DISORDER (ADHD), PREDOMINANTLY INATTENTIVE TYPE: Primary | ICD-10-CM

## 2021-06-17 NOTE — TELEPHONE ENCOUNTER
Caller: Laura Ordaz    Relationship: Self    Best call back number: [phone]     Medication needed:   Requested Prescriptions      No prescriptions requested or ordered in this encounter   ADDERALL  30 MG 2X A DAY    When do you need the refill by: 10 DAYS    What additional details did the patient provide when requesting the medication:     Does the patient have less than a 3 day supply:  [] Yes  [x] No    What is the patient's preferred pharmacy: [facility] [place] - [address]  [facility] [phone] [facility] [phone] FX

## 2021-06-18 RX ORDER — DEXTROAMPHETAMINE SACCHARATE, AMPHETAMINE ASPARTATE, DEXTROAMPHETAMINE SULFATE AND AMPHETAMINE SULFATE 7.5; 7.5; 7.5; 7.5 MG/1; MG/1; MG/1; MG/1
1 TABLET ORAL 2 TIMES DAILY
COMMUNITY
Start: 2021-05-25 | End: 2021-06-18 | Stop reason: SDUPTHER

## 2021-06-21 RX ORDER — DEXTROAMPHETAMINE SACCHARATE, AMPHETAMINE ASPARTATE, DEXTROAMPHETAMINE SULFATE AND AMPHETAMINE SULFATE 7.5; 7.5; 7.5; 7.5 MG/1; MG/1; MG/1; MG/1
1 TABLET ORAL 2 TIMES DAILY
Qty: 60 TABLET | Refills: 0 | Status: SHIPPED | OUTPATIENT
Start: 2021-06-21 | End: 2021-08-16 | Stop reason: SDUPTHER

## 2021-07-14 DIAGNOSIS — F90.0 ATTENTION DEFICIT HYPERACTIVITY DISORDER (ADHD), PREDOMINANTLY INATTENTIVE TYPE: ICD-10-CM

## 2021-07-15 VITALS
HEIGHT: 69 IN | WEIGHT: 243.12 LBS | OXYGEN SATURATION: 100 % | TEMPERATURE: 97.8 F | HEART RATE: 94 BPM | BODY MASS INDEX: 36.01 KG/M2 | SYSTOLIC BLOOD PRESSURE: 115 MMHG | DIASTOLIC BLOOD PRESSURE: 77 MMHG

## 2021-07-15 RX ORDER — DEXTROAMPHETAMINE SACCHARATE, AMPHETAMINE ASPARTATE, DEXTROAMPHETAMINE SULFATE AND AMPHETAMINE SULFATE 7.5; 7.5; 7.5; 7.5 MG/1; MG/1; MG/1; MG/1
1 TABLET ORAL 2 TIMES DAILY
Qty: 60 TABLET | Refills: 0 | OUTPATIENT
Start: 2021-07-15

## 2021-07-25 DIAGNOSIS — F90.0 ATTENTION DEFICIT HYPERACTIVITY DISORDER (ADHD), PREDOMINANTLY INATTENTIVE TYPE: ICD-10-CM

## 2021-07-26 RX ORDER — DEXTROAMPHETAMINE SACCHARATE, AMPHETAMINE ASPARTATE, DEXTROAMPHETAMINE SULFATE AND AMPHETAMINE SULFATE 7.5; 7.5; 7.5; 7.5 MG/1; MG/1; MG/1; MG/1
1 TABLET ORAL 2 TIMES DAILY
Qty: 60 TABLET | Refills: 0 | OUTPATIENT
Start: 2021-07-26

## 2021-08-02 DIAGNOSIS — F90.0 ATTENTION DEFICIT HYPERACTIVITY DISORDER (ADHD), PREDOMINANTLY INATTENTIVE TYPE: ICD-10-CM

## 2021-08-02 RX ORDER — DEXTROAMPHETAMINE SACCHARATE, AMPHETAMINE ASPARTATE, DEXTROAMPHETAMINE SULFATE AND AMPHETAMINE SULFATE 7.5; 7.5; 7.5; 7.5 MG/1; MG/1; MG/1; MG/1
1 TABLET ORAL 2 TIMES DAILY
Qty: 60 TABLET | Refills: 0 | OUTPATIENT
Start: 2021-08-02

## 2021-08-11 RX ORDER — NAPROXEN 250 MG/1
1 TABLET ORAL EVERY 6 HOURS PRN
COMMUNITY

## 2021-08-11 RX ORDER — HYDROXYZINE PAMOATE 50 MG/1
50 CAPSULE ORAL DAILY PRN
COMMUNITY
End: 2022-09-06 | Stop reason: SDUPTHER

## 2021-08-11 RX ORDER — NORGESTIMATE AND ETHINYL ESTRADIOL 0.25-0.035
1 KIT ORAL DAILY
COMMUNITY
Start: 2021-01-15 | End: 2021-11-16 | Stop reason: SDUPTHER

## 2021-08-11 RX ORDER — TRAZODONE HYDROCHLORIDE 50 MG/1
1 TABLET ORAL NIGHTLY PRN
COMMUNITY

## 2021-08-13 ENCOUNTER — TELEPHONE (OUTPATIENT)
Dept: FAMILY MEDICINE CLINIC | Facility: CLINIC | Age: 36
End: 2021-08-13

## 2021-08-13 NOTE — TELEPHONE ENCOUNTER
Caller: Laura Ordaz    Relationship: Self    Best call back number: [phone]       Which medication are you concerned about: amphetamine-dextroamphetamine (ADDERALL) 30 MG tablet    Who prescribed you this medication: BECCA ULLOA    What are your concerns: PATIENT STATES THAT SHE LAST REFILLED THIS MEDICATION 6/22 AND ITS NOT ABLE TO FILL IT AGAIN AS OF 7/23 AND 7/27 AS WELL AS 8/1, AND WANTS TO KNOW WHY IT CANT BE REFILLED RIGHT NOW.       [facility] [place] - [address]  [facility] - [phone]  - [phone] FX  [phone]

## 2021-08-16 ENCOUNTER — OFFICE VISIT (OUTPATIENT)
Dept: FAMILY MEDICINE CLINIC | Facility: CLINIC | Age: 36
End: 2021-08-16

## 2021-08-16 VITALS
WEIGHT: 240.6 LBS | DIASTOLIC BLOOD PRESSURE: 83 MMHG | OXYGEN SATURATION: 100 % | HEIGHT: 69 IN | BODY MASS INDEX: 35.63 KG/M2 | SYSTOLIC BLOOD PRESSURE: 119 MMHG | HEART RATE: 94 BPM

## 2021-08-16 DIAGNOSIS — Z13.220 NEED FOR LIPID SCREENING: ICD-10-CM

## 2021-08-16 DIAGNOSIS — Z79.899 MEDICATION MANAGEMENT: ICD-10-CM

## 2021-08-16 DIAGNOSIS — G47.00 INSOMNIA, UNSPECIFIED TYPE: Primary | ICD-10-CM

## 2021-08-16 DIAGNOSIS — F90.0 ATTENTION DEFICIT HYPERACTIVITY DISORDER (ADHD), PREDOMINANTLY INATTENTIVE TYPE: ICD-10-CM

## 2021-08-16 DIAGNOSIS — Z13.29 SCREENING FOR THYROID DISORDER: ICD-10-CM

## 2021-08-16 PROBLEM — J45.909 ASTHMA: Status: ACTIVE | Noted: 2021-08-16

## 2021-08-16 PROBLEM — F39 MOOD DISORDER (HCC): Status: ACTIVE | Noted: 2021-08-16

## 2021-08-16 PROBLEM — F98.8 ATTENTION DEFICIT DISORDER: Status: ACTIVE | Noted: 2021-08-16

## 2021-08-16 PROBLEM — J30.9 ALLERGIC RHINITIS: Status: ACTIVE | Noted: 2021-08-16

## 2021-08-16 PROCEDURE — 80305 DRUG TEST PRSMV DIR OPT OBS: CPT | Performed by: NURSE PRACTITIONER

## 2021-08-16 PROCEDURE — 99213 OFFICE O/P EST LOW 20 MIN: CPT | Performed by: NURSE PRACTITIONER

## 2021-08-16 RX ORDER — DEXTROAMPHETAMINE SACCHARATE, AMPHETAMINE ASPARTATE, DEXTROAMPHETAMINE SULFATE AND AMPHETAMINE SULFATE 7.5; 7.5; 7.5; 7.5 MG/1; MG/1; MG/1; MG/1
1 TABLET ORAL 2 TIMES DAILY
Qty: 60 TABLET | Refills: 0 | Status: CANCELLED | OUTPATIENT
Start: 2021-08-16

## 2021-08-16 RX ORDER — DEXTROAMPHETAMINE SACCHARATE, AMPHETAMINE ASPARTATE, DEXTROAMPHETAMINE SULFATE AND AMPHETAMINE SULFATE 7.5; 7.5; 7.5; 7.5 MG/1; MG/1; MG/1; MG/1
1 TABLET ORAL 2 TIMES DAILY
Qty: 60 TABLET | Refills: 0 | Status: SHIPPED | OUTPATIENT
Start: 2021-08-16 | End: 2021-09-15 | Stop reason: SDUPTHER

## 2021-08-16 NOTE — PROGRESS NOTES
Chief Complaint  ADD (Patient is currently taking Adderall 30mg BID and is doing well with it. )    Subjective          Laura Ordaz presents to [facility]  History of Present Illness  ADHD-pt currently on Adderall 30mg twice daily with good results.  Patient has had some difficulty getting her refills.  And states she has not had it refilled since 6/22/2021.  Taiwo confirms this.    Insomnia-currently on trazodone and doing well.      Past Medical History:   Diagnosis Date   • Asthma    • Chronic allergic rhinitis    • Mood disorder (CMS/HCC)          No Known Allergies       Past Surgical History:   Procedure Laterality Date   • CYST REMOVAL     • [place] TOOTH EXTRACTION            Social History     Tobacco Use   • Smoking status: Former Smoker     Packs/day: 1.00   • Smokeless tobacco: Never Used   • Tobacco comment: STARTED AT AGE 10 AND STOPPED AT 26    Substance Use Topics   • Alcohol use: Yes     Comment: CURRENT SOME DAY/ DRINKS WEEKLY, WINE AND BEER         Family History   Problem Relation Age of Onset   • Colon cancer Other    • Breast cancer Other    • Lung cancer Other    • Heart disease Other    • Hypertension Other    • Diabetes Other         MELLITUS          Current Outpatient Medications on File Prior to Visit   Medication Sig   • amphetamine-dextroamphetamine (ADDERALL) 30 MG tablet Take 1 tablet by mouth 2 (two) times a day.   • hydrOXYzine pamoate (VISTARIL) 50 MG capsule Take 50 mg by mouth Daily As Needed.   • naproxen (NAPROSYN) 250 MG tablet Take 1 tablet by mouth Every 6 (Six) Hours As Needed.   • norgestimate-ethinyl estradiol (Sprintec 28) 0.25-35 MG-MCG per tablet Take 1 tablet by mouth Daily.   • traZODone (DESYREL) 50 MG tablet Take 1 tablet by mouth At Night As Needed.     No current facility-administered medications on file prior to visit.         Immunization History   Administered Date(s) Administered   • Flu Vaccine Quad PF >18YRS 10/10/2020  "        /83   Pulse 94   Ht 175.3 cm (69\")   Wt 109 kg (240 lb 9.6 oz)   SpO2 100%   BMI 35.53 kg/m²             Physical Exam  Vitals reviewed.   Constitutional:       Appearance: Normal appearance. She is well-developed.   HENT:      Head: Normocephalic and atraumatic.      Right Ear: External ear normal.      Left Ear: External ear normal.      Mouth/Throat:      Pharynx: No oropharyngeal exudate.   Eyes:      Conjunctiva/sclera: Conjunctivae normal.      Pupils: Pupils are equal, round, and reactive to light.   Cardiovascular:      Rate and Rhythm: Normal rate and regular rhythm.      Heart sounds: No murmur heard.   No friction rub. No gallop.    Pulmonary:      Effort: Pulmonary effort is normal.      Breath sounds: Normal breath sounds. No wheezing or rhonchi.   Skin:     General: Skin is warm and dry.   Neurological:      Mental Status: She is alert and oriented to person, place, and time.      Cranial Nerves: No cranial nerve deficit.   Psychiatric:         Mood and Affect: Mood and affect normal.         Behavior: Behavior normal.         Thought Content: Thought content normal.         Judgment: Judgment normal.             Result Review :     The following data was reviewed by: MORIAH Calderon on 08/16/2021:    POC Urine Drug Screen [facility]  Order: [id number]  Status:  Final result   Visible to patient:  No (scheduled for 8/16/2021 11:19 AM) Next appt:  11/16/2021 at 08:45 AM in Family Medicine (MORIAH Calderon) Dx:  Medication management  Specimen Information: Urine         0 Result Notes  Component   Ref Range & Units 11:18 3 mo ago   Amphetamine Screen, Urine   Negative Negative     AMP INTERNAL CONTROL   Passed Passed     Barbiturates Screen, Urine   Negative Negative  Negative R    BARBITURATE INTERNAL CONTROL   Passed Passed     Buprenorphine, Screen, Urine   Negative Negative     BUPRENORPHINE INTERNAL CONTROL   Passed Passed     Benzodiazepine Screen, Urine "   Negative Negative  Negative R    BENZODIAZEPINE INTERNAL CONTROL   Passed Passed     Cocaine Screen, Urine   Negative Negative  Negative R    COCAINE INTERNAL CONTROL   Passed Passed     MDMA (ECSTASY)   Negative Negative     MDMA (ECSTASY) INTERNAL CONTROL   Passed Passed     Methamphetamine, Ur   Negative Negative     METHAMPHETAMINE INTERNAL CONTROL   Passed Passed     Methadone Screen, Urine   Negative Negative  Negative R    METHADONE INTERNAL CONTROL   Passed Passed     Opiate Screen   Negative Negative     OPIATES INTERNAL CONTROL   Passed Passed     Oxycodone Screen, Urine   Negative Negative  Negative R    OXYCODONE INTERNAL CONTROL   Passed Passed     Phencyclidine (PCP), Urine   Negative Negative  Negative R    PHENCYCLIDINE INTERNAL CONTROL   Passed Passed     THC, Screen, Urine   Negative Negative  Negative R    THC INTERNAL CONTROL   Passed Passed     Lot Number  [id number]     Expiration Date  04/30/2022     Resulting Agency  [facility]         Specimen Collected: 08/16/21 11:18 Last Resulted: 08/16/21 11:18                               Assessment and Plan      Diagnoses and all orders for this visit:    1. Insomnia, unspecified type (Primary)  Assessment & Plan:  Doing well on trazodone, cont current medications.       2. Attention deficit hyperactivity disorder (ADHD), predominantly inattentive type  Comments:  doing well on current dose, will send for refill    3. Medication management  -     POC Urine Drug Screen [facility]  -     Comprehensive Metabolic Panel  -     CBC & Differential    4. Need for lipid screening  -     Lipid Panel    5. Screening for thyroid disorder  -     TSH    Other orders  -     Cancel: amphetamine-dextroamphetamine (ADDERALL) 30 MG tablet; Take 1 tablet by mouth 2 (two) times a day.  Dispense: 60 tablet; Refill: 0            Follow Up     Return in about 3 months (around 11/16/2021).    Patient was given instructions and counseling regarding her condition or for health  maintenance advice. Please see specific information pulled into the AVS if appropriate.

## 2021-08-16 NOTE — TELEPHONE ENCOUNTER
Pt requesting refill on adderall 30mg one po bid #60/nr. Seen in office today, manual kate completed and scanned into chart.

## 2021-08-30 ENCOUNTER — TELEPHONE (OUTPATIENT)
Dept: FAMILY MEDICINE CLINIC | Facility: CLINIC | Age: 36
End: 2021-08-30

## 2021-08-30 NOTE — TELEPHONE ENCOUNTER
----- Message from Lizette Ordaz sent at 8/27/2021  9:24 AM EDT -----  Regarding: FW: Non-Urgent Medical Question  Contact: [phone]    ----- Message -----  From: Laura Ordaz  Sent: 8/27/2021   7:41 AM EDT  To: [facility] Clinical Pool  Subject: Non-Urgent Medical Question                      Am I able to come in to get a Toradol shot for my sciatic. It's been out since last saterday.

## 2021-09-12 DIAGNOSIS — F90.0 ATTENTION DEFICIT HYPERACTIVITY DISORDER (ADHD), PREDOMINANTLY INATTENTIVE TYPE: ICD-10-CM

## 2021-09-13 RX ORDER — DEXTROAMPHETAMINE SACCHARATE, AMPHETAMINE ASPARTATE, DEXTROAMPHETAMINE SULFATE AND AMPHETAMINE SULFATE 7.5; 7.5; 7.5; 7.5 MG/1; MG/1; MG/1; MG/1
1 TABLET ORAL 2 TIMES DAILY
Qty: 60 TABLET | Refills: 0 | OUTPATIENT
Start: 2021-09-13

## 2021-09-14 ENCOUNTER — PATIENT MESSAGE (OUTPATIENT)
Dept: FAMILY MEDICINE CLINIC | Facility: CLINIC | Age: 36
End: 2021-09-14

## 2021-09-15 DIAGNOSIS — F90.0 ATTENTION DEFICIT HYPERACTIVITY DISORDER (ADHD), PREDOMINANTLY INATTENTIVE TYPE: ICD-10-CM

## 2021-09-15 NOTE — TELEPHONE ENCOUNTER
From: Laura Ordaz  To: Leny Lovelace, MORIAH  Sent: 9/14/2021 8:01 PM EDT  Subject: Prescription Question    Good morning,  I tried to refill my medications yesterday and the refill was denied today, no reason given. I have 2 days left of meds.    Laura

## 2021-09-16 RX ORDER — DEXTROAMPHETAMINE SACCHARATE, AMPHETAMINE ASPARTATE, DEXTROAMPHETAMINE SULFATE AND AMPHETAMINE SULFATE 7.5; 7.5; 7.5; 7.5 MG/1; MG/1; MG/1; MG/1
1 TABLET ORAL 2 TIMES DAILY
Qty: 60 TABLET | Refills: 0 | Status: SHIPPED | OUTPATIENT
Start: 2021-09-16 | End: 2021-10-20 | Stop reason: SDUPTHER

## 2021-10-11 DIAGNOSIS — F90.0 ATTENTION DEFICIT HYPERACTIVITY DISORDER (ADHD), PREDOMINANTLY INATTENTIVE TYPE: ICD-10-CM

## 2021-10-13 DIAGNOSIS — F90.0 ATTENTION DEFICIT HYPERACTIVITY DISORDER (ADHD), PREDOMINANTLY INATTENTIVE TYPE: ICD-10-CM

## 2021-10-14 RX ORDER — DEXTROAMPHETAMINE SACCHARATE, AMPHETAMINE ASPARTATE, DEXTROAMPHETAMINE SULFATE AND AMPHETAMINE SULFATE 7.5; 7.5; 7.5; 7.5 MG/1; MG/1; MG/1; MG/1
1 TABLET ORAL 2 TIMES DAILY
Qty: 60 TABLET | Refills: 0 | OUTPATIENT
Start: 2021-10-14

## 2021-10-20 DIAGNOSIS — F90.0 ATTENTION DEFICIT HYPERACTIVITY DISORDER (ADHD), PREDOMINANTLY INATTENTIVE TYPE: ICD-10-CM

## 2021-10-21 RX ORDER — DEXTROAMPHETAMINE SACCHARATE, AMPHETAMINE ASPARTATE, DEXTROAMPHETAMINE SULFATE AND AMPHETAMINE SULFATE 7.5; 7.5; 7.5; 7.5 MG/1; MG/1; MG/1; MG/1
1 TABLET ORAL 2 TIMES DAILY
Qty: 60 TABLET | Refills: 0 | Status: SHIPPED | OUTPATIENT
Start: 2021-10-21 | End: 2021-11-16 | Stop reason: SDUPTHER

## 2021-11-16 ENCOUNTER — OFFICE VISIT (OUTPATIENT)
Dept: FAMILY MEDICINE CLINIC | Facility: CLINIC | Age: 36
End: 2021-11-16

## 2021-11-16 VITALS
BODY MASS INDEX: 36.88 KG/M2 | SYSTOLIC BLOOD PRESSURE: 122 MMHG | OXYGEN SATURATION: 99 % | DIASTOLIC BLOOD PRESSURE: 72 MMHG | WEIGHT: 249 LBS | HEIGHT: 69 IN | HEART RATE: 104 BPM

## 2021-11-16 DIAGNOSIS — F90.0 ATTENTION DEFICIT HYPERACTIVITY DISORDER (ADHD), PREDOMINANTLY INATTENTIVE TYPE: ICD-10-CM

## 2021-11-16 DIAGNOSIS — Z79.899 MEDICATION MANAGEMENT: ICD-10-CM

## 2021-11-16 DIAGNOSIS — G47.00 INSOMNIA, UNSPECIFIED TYPE: ICD-10-CM

## 2021-11-16 DIAGNOSIS — Z30.40 ENCOUNTER FOR SURVEILLANCE OF CONTRACEPTIVES, UNSPECIFIED CONTRACEPTIVE: ICD-10-CM

## 2021-11-16 DIAGNOSIS — F90.0 ATTENTION DEFICIT HYPERACTIVITY DISORDER (ADHD), PREDOMINANTLY INATTENTIVE TYPE: Primary | ICD-10-CM

## 2021-11-16 LAB
AMPHET+METHAMPHET UR QL: POSITIVE
AMPHETAMINE INTERNAL CONTROL: ABNORMAL
AMPHETAMINES UR QL: NEGATIVE
BARBITURATE INTERNAL CONTROL: ABNORMAL
BARBITURATES UR QL SCN: NEGATIVE
BENZODIAZ UR QL SCN: NEGATIVE
BENZODIAZEPINE INTERNAL CONTROL: ABNORMAL
BUPRENORPHINE INTERNAL CONTROL: ABNORMAL
BUPRENORPHINE SERPL-MCNC: NEGATIVE NG/ML
CANNABINOIDS SERPL QL: NEGATIVE
COCAINE INTERNAL CONTROL: ABNORMAL
COCAINE UR QL: NEGATIVE
EXPIRATION DATE: ABNORMAL
Lab: ABNORMAL
MDMA (ECSTASY) INTERNAL CONTROL: ABNORMAL
MDMA UR QL SCN: NEGATIVE
METHADONE INTERNAL CONTROL: ABNORMAL
METHADONE UR QL SCN: NEGATIVE
METHAMPHETAMINE INTERNAL CONTROL: ABNORMAL
OPIATES INTERNAL CONTROL: ABNORMAL
OPIATES UR QL: NEGATIVE
OXYCODONE INTERNAL CONTROL: ABNORMAL
OXYCODONE UR QL SCN: NEGATIVE
PCP UR QL SCN: NEGATIVE
PHENCYCLIDINE INTERNAL CONTROL: ABNORMAL
THC INTERNAL CONTROL: ABNORMAL

## 2021-11-16 PROCEDURE — 99214 OFFICE O/P EST MOD 30 MIN: CPT | Performed by: NURSE PRACTITIONER

## 2021-11-16 PROCEDURE — 80305 DRUG TEST PRSMV DIR OPT OBS: CPT | Performed by: NURSE PRACTITIONER

## 2021-11-16 RX ORDER — NORGESTIMATE AND ETHINYL ESTRADIOL 0.25-0.035
1 KIT ORAL DAILY
Qty: 28 TABLET | Refills: 5 | Status: SHIPPED | OUTPATIENT
Start: 2021-11-16 | End: 2022-01-10 | Stop reason: SDUPTHER

## 2021-11-16 RX ORDER — DEXTROAMPHETAMINE SACCHARATE, AMPHETAMINE ASPARTATE, DEXTROAMPHETAMINE SULFATE AND AMPHETAMINE SULFATE 7.5; 7.5; 7.5; 7.5 MG/1; MG/1; MG/1; MG/1
1 TABLET ORAL 2 TIMES DAILY
Qty: 60 TABLET | Refills: 0 | Status: CANCELLED | OUTPATIENT
Start: 2021-11-16

## 2021-11-17 RX ORDER — DEXTROAMPHETAMINE SACCHARATE, AMPHETAMINE ASPARTATE, DEXTROAMPHETAMINE SULFATE AND AMPHETAMINE SULFATE 7.5; 7.5; 7.5; 7.5 MG/1; MG/1; MG/1; MG/1
1 TABLET ORAL 2 TIMES DAILY
Qty: 60 TABLET | Refills: 0 | Status: SHIPPED | OUTPATIENT
Start: 2021-11-17 | End: 2021-11-25 | Stop reason: SDUPTHER

## 2021-11-25 DIAGNOSIS — F90.0 ATTENTION DEFICIT HYPERACTIVITY DISORDER (ADHD), PREDOMINANTLY INATTENTIVE TYPE: ICD-10-CM

## 2021-11-30 RX ORDER — DEXTROAMPHETAMINE SACCHARATE, AMPHETAMINE ASPARTATE, DEXTROAMPHETAMINE SULFATE AND AMPHETAMINE SULFATE 7.5; 7.5; 7.5; 7.5 MG/1; MG/1; MG/1; MG/1
1 TABLET ORAL 2 TIMES DAILY
Qty: 60 TABLET | Refills: 0 | Status: SHIPPED | OUTPATIENT
Start: 2021-11-30 | End: 2022-01-27 | Stop reason: SDUPTHER

## 2022-01-10 DIAGNOSIS — Z30.40 ENCOUNTER FOR SURVEILLANCE OF CONTRACEPTIVES, UNSPECIFIED CONTRACEPTIVE: ICD-10-CM

## 2022-01-10 RX ORDER — NORGESTIMATE AND ETHINYL ESTRADIOL 0.25-0.035
1 KIT ORAL DAILY
Qty: 28 TABLET | Refills: 5 | Status: SHIPPED | OUTPATIENT
Start: 2022-01-10 | End: 2022-01-26 | Stop reason: SDUPTHER

## 2022-01-26 DIAGNOSIS — Z30.40 ENCOUNTER FOR SURVEILLANCE OF CONTRACEPTIVES, UNSPECIFIED CONTRACEPTIVE: ICD-10-CM

## 2022-01-26 DIAGNOSIS — F90.0 ATTENTION DEFICIT HYPERACTIVITY DISORDER (ADHD), PREDOMINANTLY INATTENTIVE TYPE: ICD-10-CM

## 2022-01-26 RX ORDER — DEXTROAMPHETAMINE SACCHARATE, AMPHETAMINE ASPARTATE, DEXTROAMPHETAMINE SULFATE AND AMPHETAMINE SULFATE 7.5; 7.5; 7.5; 7.5 MG/1; MG/1; MG/1; MG/1
1 TABLET ORAL 2 TIMES DAILY
Qty: 60 TABLET | Refills: 0 | Status: CANCELLED | OUTPATIENT
Start: 2022-01-26

## 2022-01-27 DIAGNOSIS — F90.0 ATTENTION DEFICIT HYPERACTIVITY DISORDER (ADHD), PREDOMINANTLY INATTENTIVE TYPE: ICD-10-CM

## 2022-01-27 RX ORDER — NORGESTIMATE AND ETHINYL ESTRADIOL 0.25-0.035
1 KIT ORAL DAILY
Qty: 28 TABLET | Refills: 5 | Status: SHIPPED | OUTPATIENT
Start: 2022-01-27 | End: 2022-12-27

## 2022-01-28 RX ORDER — DEXTROAMPHETAMINE SACCHARATE, AMPHETAMINE ASPARTATE, DEXTROAMPHETAMINE SULFATE AND AMPHETAMINE SULFATE 7.5; 7.5; 7.5; 7.5 MG/1; MG/1; MG/1; MG/1
1 TABLET ORAL 2 TIMES DAILY
Qty: 60 TABLET | Refills: 0 | Status: SHIPPED | OUTPATIENT
Start: 2022-01-28 | End: 2022-02-16 | Stop reason: SDUPTHER

## 2022-02-16 ENCOUNTER — OFFICE VISIT (OUTPATIENT)
Dept: FAMILY MEDICINE CLINIC | Facility: CLINIC | Age: 37
End: 2022-02-16

## 2022-02-16 VITALS
OXYGEN SATURATION: 100 % | HEART RATE: 111 BPM | SYSTOLIC BLOOD PRESSURE: 138 MMHG | HEIGHT: 69 IN | BODY MASS INDEX: 38.79 KG/M2 | DIASTOLIC BLOOD PRESSURE: 89 MMHG | WEIGHT: 261.9 LBS

## 2022-02-16 DIAGNOSIS — B37.31 VAGINAL CANDIDIASIS: ICD-10-CM

## 2022-02-16 DIAGNOSIS — F90.0 ATTENTION DEFICIT HYPERACTIVITY DISORDER (ADHD), PREDOMINANTLY INATTENTIVE TYPE: ICD-10-CM

## 2022-02-16 DIAGNOSIS — F90.0 ATTENTION DEFICIT HYPERACTIVITY DISORDER (ADHD), PREDOMINANTLY INATTENTIVE TYPE: Primary | ICD-10-CM

## 2022-02-16 DIAGNOSIS — Z79.899 MEDICATION MANAGEMENT: ICD-10-CM

## 2022-02-16 PROCEDURE — 99213 OFFICE O/P EST LOW 20 MIN: CPT | Performed by: NURSE PRACTITIONER

## 2022-02-16 PROCEDURE — 80305 DRUG TEST PRSMV DIR OPT OBS: CPT | Performed by: NURSE PRACTITIONER

## 2022-02-16 RX ORDER — DEXTROAMPHETAMINE SACCHARATE, AMPHETAMINE ASPARTATE, DEXTROAMPHETAMINE SULFATE AND AMPHETAMINE SULFATE 7.5; 7.5; 7.5; 7.5 MG/1; MG/1; MG/1; MG/1
1 TABLET ORAL 2 TIMES DAILY
Qty: 60 TABLET | Refills: 0 | Status: CANCELLED | OUTPATIENT
Start: 2022-02-16

## 2022-02-16 RX ORDER — FLUCONAZOLE 150 MG/1
150 TABLET ORAL ONCE
Qty: 1 TABLET | Refills: 0 | Status: SHIPPED | OUTPATIENT
Start: 2022-02-16 | End: 2022-02-16

## 2022-02-16 NOTE — PROGRESS NOTES
Chief Complaint  ADD (Adderall- patient is doing well with this. )    Subjective          Laura Ordaz presents to [facility]  History of Present Illness    ADD-pt currently on adderall bid and doing well, she is requesting refill.     Pt was recently on antibiotic from urgent care and is requesting diflucan for yeast infection.     Past Medical History:   Diagnosis Date   • Asthma    • Chronic allergic rhinitis    • Mood disorder (HCC)          No Known Allergies       Past Surgical History:   Procedure Laterality Date   • CYST REMOVAL     • WISDOM TOOTH EXTRACTION            Social History     Tobacco Use   • Smoking status: Former Smoker     Packs/day: 1.00   • Smokeless tobacco: Never Used   • Tobacco comment: STARTED AT AGE 10 AND STOPPED AT 26    Substance Use Topics   • Alcohol use: Yes     Comment: CURRENT SOME DAY/ DRINKS WEEKLY, WINE AND BEER         Family History   Problem Relation Age of Onset   • Colon cancer Other    • Breast cancer Other    • Lung cancer Other    • Heart disease Other    • Hypertension Other    • Diabetes Other         MELLITUS          Current Outpatient Medications on File Prior to Visit   Medication Sig   • amphetamine-dextroamphetamine (ADDERALL) 30 MG tablet Take 1 tablet by mouth 2 (Two) Times a Day.   • hydrOXYzine pamoate (VISTARIL) 50 MG capsule Take 50 mg by mouth Daily As Needed.   • naproxen (NAPROSYN) 250 MG tablet Take 1 tablet by mouth Every 6 (Six) Hours As Needed.   • norgestimate-ethinyl estradiol (Sprintec 28) 0.25-35 MG-MCG per tablet Take 1 tablet by mouth Daily.   • traZODone (DESYREL) 50 MG tablet Take 1 tablet by mouth At Night As Needed.     No current facility-administered medications on file prior to visit.         Immunization History   Administered Date(s) Administered   • COVID-19 (MODERNA) 1st, 2nd, 3rd Dose Only 09/23/2021   • COVID-19 (PFIZER) PURPLE CAP 09/23/2021   • Flu Vaccine Quad PF >18YRS 10/10/2020   • Flu  "Vaccine Split Quad 10/03/2018   • Influenza, Unspecified 10/24/2021   • Tdap 10/25/2017         /89   Pulse 111   Ht 175.3 cm (69\")   Wt 119 kg (261 lb 14.4 oz)   SpO2 100%   BMI 38.68 kg/m²             Physical Exam  Vitals reviewed.   Constitutional:       Appearance: Normal appearance. She is well-developed.   HENT:      Head: Normocephalic and atraumatic.      Right Ear: External ear normal.      Left Ear: External ear normal.      Mouth/Throat:      Pharynx: No oropharyngeal exudate.   Eyes:      Conjunctiva/sclera: Conjunctivae normal.      Pupils: Pupils are equal, round, and reactive to light.   Cardiovascular:      Rate and Rhythm: Normal rate and regular rhythm.      Heart sounds: No murmur heard.  No friction rub. No gallop.    Pulmonary:      Effort: Pulmonary effort is normal.      Breath sounds: Normal breath sounds. No wheezing or rhonchi.   Skin:     General: Skin is warm and dry.   Neurological:      Mental Status: She is alert and oriented to person, place, and time.      Cranial Nerves: No cranial nerve deficit.   Psychiatric:         Mood and Affect: Mood and affect normal.         Behavior: Behavior normal.         Thought Content: Thought content normal.         Judgment: Judgment normal.             Result Review :       Amphetamine Screen, Urine   Date Value Ref Range Status   02/16/2022 Positive (A) Negative Final     Barbiturates Screen, Urine   Date Value Ref Range Status   02/16/2022 Negative Negative Final     Buprenorphine, Screen, Urine   Date Value Ref Range Status   02/16/2022 Negative Negative Final     Benzodiazepine Screen, Urine   Date Value Ref Range Status   02/16/2022 Negative Negative Final     Cocaine Screen, Urine   Date Value Ref Range Status   02/16/2022 Negative Negative Final   05/17/2021 Negative  Final     MDMA (ECSTASY)   Date Value Ref Range Status   02/16/2022 Negative Negative Final     Methamphetamine, Ur   Date Value Ref Range Status   02/16/2022 " Negative Negative Final     Methadone Screen, Urine   Date Value Ref Range Status   02/16/2022 Negative Negative Final     Oxycodone Screen, Urine   Date Value Ref Range Status   02/16/2022 Negative Negative Final     Phencyclidine (PCP), Urine   Date Value Ref Range Status   02/16/2022 Negative Negative Final     THC, Screen, Urine   Date Value Ref Range Status   02/16/2022 Negative Negative Final     Lot Number   Date Value Ref Range Status   02/16/2022 [id number]  Final     Expiration Date   Date Value Ref Range Status   02/16/2022 2/28/23  Final                         Assessment and Plan      Diagnoses and all orders for this visit:    1. Attention deficit hyperactivity disorder (ADHD), predominantly inattentive type (Primary)  Comments:  symptoms controlled with Adderall, cont medication, will request refill  Orders:  -     POC Urine Drug Screen [facility]    2. Medication management  -     POC Urine Drug Screen [facility]    3. Vaginal candidiasis  Comments:  diflucan 150mg times one, side effects and administration addressed, f/u if no improvement.   Orders:  -     fluconazole (Diflucan) 150 MG tablet; Take 1 tablet by mouth 1 (One) Time for 1 dose.  Dispense: 1 tablet; Refill: 0              Follow Up     Return in about 3 months (around 5/16/2022).    Patient was given instructions and counseling regarding her condition or for health maintenance advice. Please see specific information pulled into the AVS if appropriate.

## 2022-02-17 RX ORDER — DEXTROAMPHETAMINE SACCHARATE, AMPHETAMINE ASPARTATE, DEXTROAMPHETAMINE SULFATE AND AMPHETAMINE SULFATE 7.5; 7.5; 7.5; 7.5 MG/1; MG/1; MG/1; MG/1
1 TABLET ORAL 2 TIMES DAILY
Qty: 60 TABLET | Refills: 0 | Status: SHIPPED | OUTPATIENT
Start: 2022-02-17 | End: 2022-04-25 | Stop reason: SDUPTHER

## 2022-03-04 DIAGNOSIS — Z30.40 ENCOUNTER FOR SURVEILLANCE OF CONTRACEPTIVES, UNSPECIFIED CONTRACEPTIVE: ICD-10-CM

## 2022-03-07 RX ORDER — NORGESTIMATE AND ETHINYL ESTRADIOL 0.25-0.035
1 KIT ORAL DAILY
Qty: 28 TABLET | Refills: 5 | OUTPATIENT
Start: 2022-03-07

## 2022-04-25 DIAGNOSIS — F90.0 ATTENTION DEFICIT HYPERACTIVITY DISORDER (ADHD), PREDOMINANTLY INATTENTIVE TYPE: ICD-10-CM

## 2022-04-25 RX ORDER — DEXTROAMPHETAMINE SACCHARATE, AMPHETAMINE ASPARTATE, DEXTROAMPHETAMINE SULFATE AND AMPHETAMINE SULFATE 7.5; 7.5; 7.5; 7.5 MG/1; MG/1; MG/1; MG/1
1 TABLET ORAL 2 TIMES DAILY
Qty: 60 TABLET | Refills: 0 | Status: SHIPPED | OUTPATIENT
Start: 2022-04-25 | End: 2022-06-01 | Stop reason: SDUPTHER

## 2022-06-01 ENCOUNTER — OFFICE VISIT (OUTPATIENT)
Dept: FAMILY MEDICINE CLINIC | Facility: CLINIC | Age: 37
End: 2022-06-01

## 2022-06-01 VITALS
HEIGHT: 69 IN | SYSTOLIC BLOOD PRESSURE: 127 MMHG | WEIGHT: 271.6 LBS | TEMPERATURE: 97.8 F | HEART RATE: 104 BPM | OXYGEN SATURATION: 99 % | BODY MASS INDEX: 40.23 KG/M2 | DIASTOLIC BLOOD PRESSURE: 62 MMHG

## 2022-06-01 DIAGNOSIS — M62.838 TRAPEZIUS MUSCLE SPASM: ICD-10-CM

## 2022-06-01 DIAGNOSIS — F90.0 ATTENTION DEFICIT HYPERACTIVITY DISORDER (ADHD), PREDOMINANTLY INATTENTIVE TYPE: ICD-10-CM

## 2022-06-01 DIAGNOSIS — Z79.899 MEDICATION MANAGEMENT: ICD-10-CM

## 2022-06-01 DIAGNOSIS — F90.0 ATTENTION DEFICIT HYPERACTIVITY DISORDER (ADHD), PREDOMINANTLY INATTENTIVE TYPE: Primary | ICD-10-CM

## 2022-06-01 PROCEDURE — 99213 OFFICE O/P EST LOW 20 MIN: CPT | Performed by: NURSE PRACTITIONER

## 2022-06-01 PROCEDURE — 80305 DRUG TEST PRSMV DIR OPT OBS: CPT | Performed by: NURSE PRACTITIONER

## 2022-06-01 RX ORDER — ALBUTEROL SULFATE 90 UG/1
2 AEROSOL, METERED RESPIRATORY (INHALATION) EVERY 4 HOURS PRN
COMMUNITY
End: 2022-10-09 | Stop reason: SDUPTHER

## 2022-06-01 RX ORDER — CYCLOBENZAPRINE HCL 10 MG
10 TABLET ORAL 2 TIMES DAILY PRN
Qty: 60 TABLET | Refills: 0 | Status: SHIPPED | OUTPATIENT
Start: 2022-06-01 | End: 2022-09-06

## 2022-06-01 RX ORDER — DEXTROAMPHETAMINE SACCHARATE, AMPHETAMINE ASPARTATE, DEXTROAMPHETAMINE SULFATE AND AMPHETAMINE SULFATE 7.5; 7.5; 7.5; 7.5 MG/1; MG/1; MG/1; MG/1
1 TABLET ORAL 2 TIMES DAILY
Qty: 60 TABLET | Refills: 0 | Status: CANCELLED | OUTPATIENT
Start: 2022-06-01

## 2022-06-01 RX ORDER — DEXTROAMPHETAMINE SACCHARATE, AMPHETAMINE ASPARTATE, DEXTROAMPHETAMINE SULFATE AND AMPHETAMINE SULFATE 7.5; 7.5; 7.5; 7.5 MG/1; MG/1; MG/1; MG/1
1 TABLET ORAL 2 TIMES DAILY
Qty: 60 TABLET | Refills: 0 | Status: SHIPPED | OUTPATIENT
Start: 2022-06-01 | End: 2022-07-26 | Stop reason: SDUPTHER

## 2022-06-01 NOTE — PROGRESS NOTES
Chief Complaint  Shoulder Pain and ADD    Subjective          Laura Ordaz presents to [facility]  History of Present Illness    Patient complaining of pain and decreased range of motion in left shoulder.  Patient went to [facility] approximately 1 week ago for same, she had xray which was negative.  Patient states she is concerned about rotator cuff injury or tendon tear.  Patient describes pain as constant, sharp, deep pain that can't be 'touched', forces patient to sleep on stomach.  Patient rates pain at 5/10.  Patient has tried Ibuprofen OTC with some relief of symptoms.    Attention Deficit Disorder:  Patient is taking Adderall, with good control of symptoms.  Patient states she is able to concentrate and focus on tasks.  Patient denies any side effects from medication.      Past Medical History:   Diagnosis Date   • ADHD (attention deficit hyperactivity disorder) 9/2017   • Allergic 1990   • Anxiety 1998   • Asthma    • Chronic allergic rhinitis    • Depression 1998   • Mood disorder (HCC)          No Known Allergies       Past Surgical History:   Procedure Laterality Date   • CYST REMOVAL     • WISDOM TOOTH EXTRACTION            Social History     Tobacco Use   • Smoking status: Former Smoker     Packs/day: 1.00     Years: 20.00     Pack years: 20.00     Types: Cigarettes   • Smokeless tobacco: Never Used   • Tobacco comment: Social smoker   Substance Use Topics   • Alcohol use: Yes     Alcohol/week: 1.0 standard drink     Types: 1 Glasses of wine per week     Comment: One glass a week         Family History   Problem Relation Age of Onset   • Colon cancer Other    • Breast cancer Other    • Lung cancer Other    • Heart disease Other    • Hypertension Other    • Diabetes Other         MELLITUS   • Thyroid disease Mother    • Anxiety disorder Father    • Diabetes Maternal Grandfather    • Heart disease Maternal Grandfather    • Hyperlipidemia Maternal  "Grandfather           Current Outpatient Medications on File Prior to Visit   Medication Sig   • albuterol sulfate  (90 Base) MCG/ACT inhaler Inhale 2 puffs Every 4 (Four) Hours As Needed for Wheezing.   • amphetamine-dextroamphetamine (ADDERALL) 30 MG tablet Take 1 tablet by mouth 2 (Two) Times a Day.   • hydrOXYzine pamoate (VISTARIL) 50 MG capsule Take 50 mg by mouth Daily As Needed.   • naproxen (NAPROSYN) 250 MG tablet Take 1 tablet by mouth Every 6 (Six) Hours As Needed.   • norgestimate-ethinyl estradiol (Sprintec 28) 0.25-35 MG-MCG per tablet Take 1 tablet by mouth Daily.   • traZODone (DESYREL) 50 MG tablet Take 1 tablet by mouth At Night As Needed.     No current facility-administered medications on file prior to visit.         Immunization History   Administered Date(s) Administered   • COVID-19 (MODERNA) 1st, 2nd, 3rd Dose Only 09/23/2021, 11/26/2021   • COVID-19 (PFIZER) PURPLE CAP 09/23/2021   • Flu Vaccine Split Quad 10/03/2018   • Fluzone Split Quad (Multi-dose) 10/10/2020   • Influenza, Unspecified 10/24/2021   • Tdap 10/25/2017         /62 (BP Location: Left arm, Patient Position: Sitting, Cuff Size: Adult)   Pulse 104   Temp 97.8 °F (36.6 °C) (Oral)   Ht 175.3 cm (69\")   Wt 123 kg (271 lb 9.6 oz)   SpO2 99%   BMI 40.11 kg/m²             Physical Exam  Vitals reviewed.   Constitutional:       Appearance: Normal appearance. She is well-developed.   HENT:      Head: Normocephalic and atraumatic.      Right Ear: External ear normal.      Left Ear: External ear normal.      Mouth/Throat:      Pharynx: No oropharyngeal exudate.   Eyes:      Conjunctiva/sclera: Conjunctivae normal.      Pupils: Pupils are equal, round, and reactive to light.   Cardiovascular:      Rate and Rhythm: Normal rate and regular rhythm.      Heart sounds: No murmur heard.    No friction rub. No gallop.   Pulmonary:      Effort: Pulmonary effort is normal.      Breath sounds: Normal breath sounds. No wheezing " or rhonchi.   Musculoskeletal:        Arms:    Skin:     General: Skin is warm and dry.   Neurological:      Mental Status: She is alert and oriented to person, place, and time.      Cranial Nerves: No cranial nerve deficit.   Psychiatric:         Mood and Affect: Mood and affect normal.         Behavior: Behavior normal.         Thought Content: Thought content normal.         Judgment: Judgment normal.             Result Review :       Amphetamine Screen, Urine   Date Value Ref Range Status   06/01/2022 Positive (A) Negative Final     Barbiturates Screen, Urine   Date Value Ref Range Status   06/01/2022 Negative Negative Final     Buprenorphine, Screen, Urine   Date Value Ref Range Status   06/01/2022 Negative Negative Final     Benzodiazepine Screen, Urine   Date Value Ref Range Status   06/01/2022 Negative Negative Final     Cocaine Screen, Urine   Date Value Ref Range Status   06/01/2022 Negative Negative Final   05/17/2021 Negative  Final     MDMA (ECSTASY)   Date Value Ref Range Status   06/01/2022 Negative Negative Final     Methamphetamine, Ur   Date Value Ref Range Status   06/01/2022 Negative Negative Final     Methadone Screen, Urine   Date Value Ref Range Status   06/01/2022 Negative Negative Final     Oxycodone Screen, Urine   Date Value Ref Range Status   06/01/2022 Negative Negative Final     Phencyclidine (PCP), Urine   Date Value Ref Range Status   06/01/2022 Negative Negative Final     THC, Screen, Urine   Date Value Ref Range Status   06/01/2022 Negative Negative Final     Lot Number   Date Value Ref Range Status   06/01/2022 [id number]  Final     Expiration Date   Date Value Ref Range Status   06/01/2022 3/31/23  Final                         Assessment and Plan      Diagnoses and all orders for this visit:    1. Attention deficit hyperactivity disorder (ADHD), predominantly inattentive type (Primary)  -     POC Urine Drug Screen [facility]    2. Medication management  -     POC Urine Drug  Screen [facility]    3. Trapezius muscle spasm  Comments:  Trial of Flexeril as needed, side effects administration addressed.  Ice area.  Follow-up if no improvement.  Orders:  -     cyclobenzaprine (FLEXERIL) 10 MG tablet; Take 1 tablet by mouth 2 (Two) Times a Day As Needed for Muscle Spasms.  Dispense: 60 tablet; Refill: 0              Follow Up     No follow-ups on file.    Patient was given instructions and counseling regarding her condition or for health maintenance advice. Please see specific information pulled into the AVS if appropriate.

## 2022-07-26 DIAGNOSIS — F90.0 ATTENTION DEFICIT HYPERACTIVITY DISORDER (ADHD), PREDOMINANTLY INATTENTIVE TYPE: ICD-10-CM

## 2022-07-26 RX ORDER — DEXTROAMPHETAMINE SACCHARATE, AMPHETAMINE ASPARTATE, DEXTROAMPHETAMINE SULFATE AND AMPHETAMINE SULFATE 7.5; 7.5; 7.5; 7.5 MG/1; MG/1; MG/1; MG/1
1 TABLET ORAL 2 TIMES DAILY
Qty: 60 TABLET | Refills: 0 | Status: SHIPPED | OUTPATIENT
Start: 2022-07-26 | End: 2022-08-18 | Stop reason: SDUPTHER

## 2022-08-18 DIAGNOSIS — F90.0 ATTENTION DEFICIT HYPERACTIVITY DISORDER (ADHD), PREDOMINANTLY INATTENTIVE TYPE: ICD-10-CM

## 2022-08-19 RX ORDER — DEXTROAMPHETAMINE SACCHARATE, AMPHETAMINE ASPARTATE, DEXTROAMPHETAMINE SULFATE AND AMPHETAMINE SULFATE 7.5; 7.5; 7.5; 7.5 MG/1; MG/1; MG/1; MG/1
1 TABLET ORAL 2 TIMES DAILY
Qty: 60 TABLET | Refills: 0 | Status: SHIPPED | OUTPATIENT
Start: 2022-08-19 | End: 2022-10-09 | Stop reason: SDUPTHER

## 2022-09-06 ENCOUNTER — OFFICE VISIT (OUTPATIENT)
Dept: FAMILY MEDICINE CLINIC | Facility: CLINIC | Age: 37
End: 2022-09-06

## 2022-09-06 VITALS
WEIGHT: 280 LBS | OXYGEN SATURATION: 100 % | BODY MASS INDEX: 41.47 KG/M2 | HEART RATE: 102 BPM | DIASTOLIC BLOOD PRESSURE: 61 MMHG | SYSTOLIC BLOOD PRESSURE: 134 MMHG | HEIGHT: 69 IN

## 2022-09-06 DIAGNOSIS — Z13.220 LIPID SCREENING: ICD-10-CM

## 2022-09-06 DIAGNOSIS — Z79.899 MEDICATION MANAGEMENT: ICD-10-CM

## 2022-09-06 DIAGNOSIS — F90.0 ATTENTION DEFICIT HYPERACTIVITY DISORDER (ADHD), PREDOMINANTLY INATTENTIVE TYPE: Primary | ICD-10-CM

## 2022-09-06 DIAGNOSIS — G47.09 OTHER INSOMNIA: ICD-10-CM

## 2022-09-06 DIAGNOSIS — E66.01 MORBID (SEVERE) OBESITY DUE TO EXCESS CALORIES: ICD-10-CM

## 2022-09-06 DIAGNOSIS — Z13.29 THYROID DISORDER SCREEN: ICD-10-CM

## 2022-09-06 DIAGNOSIS — Z11.59 NEED FOR HEPATITIS C SCREENING TEST: ICD-10-CM

## 2022-09-06 PROCEDURE — 99214 OFFICE O/P EST MOD 30 MIN: CPT | Performed by: NURSE PRACTITIONER

## 2022-09-06 PROCEDURE — 80305 DRUG TEST PRSMV DIR OPT OBS: CPT | Performed by: NURSE PRACTITIONER

## 2022-09-06 RX ORDER — HYDROXYZINE PAMOATE 50 MG/1
50 CAPSULE ORAL DAILY PRN
Qty: 90 CAPSULE | Refills: 1 | Status: SHIPPED | OUTPATIENT
Start: 2022-09-06 | End: 2023-02-06 | Stop reason: SDUPTHER

## 2022-09-06 NOTE — PROGRESS NOTES
Chief Complaint  ADD, insomnia, contraceptive management    SUBJECTIVE  Laura Ordaz presents to [facility] for 3 month follow up.     Pt reports doing well on Adderall 30 mg. Last refill was 08/19/22.     UDS updated today.     Labs due. Set to external, pt will have them done closer to home and have the results faxed back.     Pap: PT reports had done this July at [facility]. Will obtain records.       insomnia-currently on trazadone and occasionally will take vistaril as needed. Sometimes she cannot take the trazadone at work.   Past Medical History:   Diagnosis Date   • ADHD (attention deficit hyperactivity disorder) 9/2017   • Allergic 1990   • Anxiety 1998   • Asthma    • Chronic allergic rhinitis    • Depression 1998   • Mood disorder (HCC)       Family History   Problem Relation Age of Onset   • Colon cancer Other    • Breast cancer Other    • Lung cancer Other    • Heart disease Other    • Hypertension Other    • Diabetes Other         MELLITUS   • Thyroid disease Mother    • Anxiety disorder Father    • Diabetes Maternal Grandfather    • Heart disease Maternal Grandfather    • Hyperlipidemia Maternal Grandfather       Past Surgical History:   Procedure Laterality Date   • CYST REMOVAL     • WISDOM TOOTH EXTRACTION          Current Outpatient Medications:   •  albuterol sulfate  (90 Base) MCG/ACT inhaler, Inhale 2 puffs Every 4 (Four) Hours As Needed for Wheezing., Disp: , Rfl:   •  amphetamine-dextroamphetamine (ADDERALL) 30 MG tablet, Take 1 tablet by mouth 2 (Two) Times a Day., Disp: 60 tablet, Rfl: 0  •  hydrOXYzine pamoate (VISTARIL) 50 MG capsule, Take 1 capsule by mouth Daily As Needed (sleep)., Disp: 90 capsule, Rfl: 1  •  naproxen (NAPROSYN) 250 MG tablet, Take 1 tablet by mouth Every 6 (Six) Hours As Needed., Disp: , Rfl:   •  norgestimate-ethinyl estradiol (Sprintec 28) 0.25-35 MG-MCG per tablet, Take 1 tablet by mouth Daily., Disp: 28 tablet, Rfl: 5  •   "traZODone (DESYREL) 50 MG tablet, Take 1 tablet by mouth At Night As Needed., Disp: , Rfl:     OBJECTIVE  Vital Signs:   /61   Pulse 102   Ht 175.3 cm (69\")   Wt 127 kg (280 lb)   SpO2 100%   BMI 41.35 kg/m²    Estimated body mass index is 41.35 kg/m² as calculated from the following:    Height as of this encounter: 175.3 cm (69\").    Weight as of this encounter: 127 kg (280 lb).     Wt Readings from Last 3 Encounters:   09/06/22 127 kg (280 lb)   06/01/22 123 kg (271 lb 9.6 oz)   02/16/22 119 kg (261 lb 14.4 oz)     BP Readings from Last 3 Encounters:   09/06/22 134/61   06/01/22 127/62   02/16/22 138/89     Amphetamine Screen, Urine   Date Value Ref Range Status   09/06/2022 Positive (A) Negative Final     Barbiturates Screen, Urine   Date Value Ref Range Status   09/06/2022 Negative Negative Final     Buprenorphine, Screen, Urine   Date Value Ref Range Status   09/06/2022 Negative Negative Final     Benzodiazepine Screen, Urine   Date Value Ref Range Status   09/06/2022 Negative Negative Final     Cocaine Screen, Urine   Date Value Ref Range Status   09/06/2022 Negative Negative Final   05/17/2021 Negative  Final     MDMA (ECSTASY)   Date Value Ref Range Status   09/06/2022 Negative Negative Final     Methamphetamine, Ur   Date Value Ref Range Status   09/06/2022 Negative Negative Final     Methadone Screen, Urine   Date Value Ref Range Status   09/06/2022 Negative Negative Final     Oxycodone Screen, Urine   Date Value Ref Range Status   09/06/2022 Negative Negative Final     Phencyclidine (PCP), Urine   Date Value Ref Range Status   09/06/2022 Negative Negative Final     THC, Screen, Urine   Date Value Ref Range Status   09/06/2022 Negative Negative Final     Lot Number   Date Value Ref Range Status   09/06/2022 [id number]  Final     Expiration Date   Date Value Ref Range Status   09/06/2022 09/30/23  Final       Physical Exam  Vitals reviewed.   Constitutional:       Appearance: Normal appearance. She " is well-developed.   HENT:      Head: Normocephalic and atraumatic.      Right Ear: External ear normal.      Left Ear: External ear normal.      Mouth/Throat:      Pharynx: No oropharyngeal exudate.   Eyes:      Conjunctiva/sclera: Conjunctivae normal.      Pupils: Pupils are equal, round, and reactive to light.   Neck:      Vascular: No carotid bruit.   Cardiovascular:      Rate and Rhythm: Normal rate and regular rhythm.      Heart sounds: No murmur heard.    No friction rub. No gallop.   Pulmonary:      Effort: Pulmonary effort is normal.      Breath sounds: Normal breath sounds. No wheezing or rhonchi.   Skin:     General: Skin is warm and dry.   Neurological:      Mental Status: She is alert and oriented to person, place, and time.      Cranial Nerves: No cranial nerve deficit.   Psychiatric:         Mood and Affect: Mood and affect normal.         Behavior: Behavior normal.         Thought Content: Thought content normal.         Judgment: Judgment normal.          Result Review        No Images in the past 120 days found..     The above data has been reviewed by MORIAH Calderon 09/06/2022 10:39 EDT.          Patient Care Team:  Leny Lovelace APRN as PCP - General (Family Medicine)    Class 3 Severe Obesity (BMI >=40). Obesity-related health conditions include the following: none. Obesity is improving with lifestyle modifications. BMI is is above average; BMI management plan is completed. We discussed low calorie, low carb based diet program, portion control and increasing exercise.       ASSESSMENT & PLAN    Diagnoses and all orders for this visit:    1. Attention deficit hyperactivity disorder (ADHD), predominantly inattentive type (Primary)  Comments:  doing well with Adderall, cont current medications.   Orders:  -     POC Urine Drug Screen [facility]    2. Medication management  -     POC Urine Drug Screen [facility]  -     CBC w AUTO Differential; Future  -     Comprehensive  metabolic panel; Future    3. Need for hepatitis C screening test  -     Hepatitis C antibody; Future    4. Lipid screening  -     Lipid panel; Future    5. Thyroid disorder screen  -     TSH; Future    6. Other insomnia  -     hydrOXYzine pamoate (VISTARIL) 50 MG capsule; Take 1 capsule by mouth Daily As Needed (sleep).  Dispense: 90 capsule; Refill: 1    7. Morbid (severe) obesity due to excess calories (HCC)  Comments:  Pharmacological options discussed, patient will further research and decide if she wishes to try something.         Tobacco Use: Medium Risk   • Smoking Tobacco Use: Former Smoker   • Smokeless Tobacco Use: Never Used       Follow Up     Return in about 3 months (around 12/6/2022).        Patient was given instructions and counseling regarding her condition or for health maintenance advice. Please see specific information pulled into the AVS if appropriate.   I have reviewed information obtained and documented by others and I have confirmed the accuracy of this documented note.    MORIAH Calderon

## 2022-09-19 RX ORDER — CARBOXYMETHYLCELLULOSE/CITRIC 0.75 G
0.75 CAPSULE ORAL 2 TIMES DAILY
Qty: 180 CAPSULE | Refills: 5 | Status: SHIPPED | OUTPATIENT
Start: 2022-09-19 | End: 2022-09-27 | Stop reason: SDUPTHER

## 2022-09-22 ENCOUNTER — TELEPHONE (OUTPATIENT)
Dept: FAMILY MEDICINE CLINIC | Facility: CLINIC | Age: 37
End: 2022-09-22

## 2022-09-22 NOTE — TELEPHONE ENCOUNTER
Pharmacy Name: [facility]     Pharmacy representative name: ANGELIQUE     Pharmacy representative phone number: [phone]    What medication are you calling in regards to: PLENITY    What question does the pharmacy have:ANGELIQUE STATED THAT INSTRUCTIONS WERE SENT AS TAKE 1 CAPSULE 2 TIMES DAILY AND THAT DIRECTIONS WOULD NEED TO BE PROVIDED AS  INSTRUCTS FOR 3 CAPSULES TWICE DAILY     Who is the provider that prescribed the medication:   CHENG LINK

## 2022-09-27 RX ORDER — CARBOXYMETHYLCELLULOSE/CITRIC 0.75 G
0.75 CAPSULE ORAL 2 TIMES DAILY
Qty: 180 CAPSULE | Refills: 5 | Status: SHIPPED | OUTPATIENT
Start: 2022-09-27

## 2022-10-09 DIAGNOSIS — F90.0 ATTENTION DEFICIT HYPERACTIVITY DISORDER (ADHD), PREDOMINANTLY INATTENTIVE TYPE: ICD-10-CM

## 2022-10-09 DIAGNOSIS — R06.2 WHEEZING: Primary | ICD-10-CM

## 2022-10-11 RX ORDER — DEXTROAMPHETAMINE SACCHARATE, AMPHETAMINE ASPARTATE, DEXTROAMPHETAMINE SULFATE AND AMPHETAMINE SULFATE 7.5; 7.5; 7.5; 7.5 MG/1; MG/1; MG/1; MG/1
1 TABLET ORAL 2 TIMES DAILY
Qty: 60 TABLET | Refills: 0 | Status: SHIPPED | OUTPATIENT
Start: 2022-10-11 | End: 2022-11-22 | Stop reason: SDUPTHER

## 2022-10-11 RX ORDER — ALBUTEROL SULFATE 90 UG/1
2 AEROSOL, METERED RESPIRATORY (INHALATION) EVERY 4 HOURS PRN
Qty: 6.7 G | Refills: 0 | Status: SHIPPED | OUTPATIENT
Start: 2022-10-11 | End: 2022-11-02

## 2022-11-02 DIAGNOSIS — R06.2 WHEEZING: ICD-10-CM

## 2022-11-02 RX ORDER — ALBUTEROL SULFATE 90 UG/1
AEROSOL, METERED RESPIRATORY (INHALATION)
Qty: 8.5 G | Refills: 3 | Status: SHIPPED | OUTPATIENT
Start: 2022-11-02

## 2022-11-22 DIAGNOSIS — F90.0 ATTENTION DEFICIT HYPERACTIVITY DISORDER (ADHD), PREDOMINANTLY INATTENTIVE TYPE: ICD-10-CM

## 2022-11-22 RX ORDER — DEXTROAMPHETAMINE SACCHARATE, AMPHETAMINE ASPARTATE, DEXTROAMPHETAMINE SULFATE AND AMPHETAMINE SULFATE 7.5; 7.5; 7.5; 7.5 MG/1; MG/1; MG/1; MG/1
1 TABLET ORAL 2 TIMES DAILY
Qty: 60 TABLET | Refills: 0 | Status: SHIPPED | OUTPATIENT
Start: 2022-11-22 | End: 2022-12-28 | Stop reason: CLARIF

## 2022-12-26 DIAGNOSIS — Z30.40 ENCOUNTER FOR SURVEILLANCE OF CONTRACEPTIVES, UNSPECIFIED CONTRACEPTIVE: ICD-10-CM

## 2022-12-27 RX ORDER — NORGESTIMATE AND ETHINYL ESTRADIOL 0.25-0.035
KIT ORAL
Qty: 28 TABLET | Refills: 5 | Status: SHIPPED | OUTPATIENT
Start: 2022-12-27

## 2022-12-28 NOTE — TELEPHONE ENCOUNTER
Patient unable to locate Adderall at pharmacy.  Requesting change of medication.  UDS up-to-date.  She is currently on Adderall 30 mg twice daily.  Okay to change to Vyvanse 70 mg daily?

## 2022-12-30 ENCOUNTER — TELEPHONE (OUTPATIENT)
Dept: FAMILY MEDICINE CLINIC | Facility: CLINIC | Age: 37
End: 2022-12-30
Payer: COMMERCIAL

## 2022-12-30 NOTE — TELEPHONE ENCOUNTER
Caller: [facility]    Best call back number: [phone], [phone]    Who are you requesting to speak with (clinical staff, provider,  specific staff member): [employer] [employer]    Do you know the name of the person who called: JASVIR    What was the call regarding: PHARMACY IS CALLING REGARDING THE PRIOR AUTHORIZATION FOR VYVANSE. THEY NEED TO KNOW IF THE PATIENT HAS EVER TRIED OTHER MEDICATIONS FOR THE SAME DIAGNOSES.

## 2023-01-03 ENCOUNTER — TELEPHONE (OUTPATIENT)
Dept: FAMILY MEDICINE CLINIC | Facility: CLINIC | Age: 38
End: 2023-01-03
Payer: COMMERCIAL

## 2023-01-03 NOTE — TELEPHONE ENCOUNTER
[facility] CALLED AND NEEDS CLINICAL NOTES THEY TRIED AND FAILED FAXED TO THEM FOR PA OF lisdexamfetamine (Vyvanse) 70 MG capsule    PLEASE CALL  RENETTA [phone] 24 HOUR LINE  OR FAX INFO [phone]

## 2023-01-03 NOTE — TELEPHONE ENCOUNTER
Vyvanse denied per insurance.  Unable to fax office notes supporting Vyvanse Rx as she was given Rx because Adderall was on back order.

## 2023-01-03 NOTE — TELEPHONE ENCOUNTER
----- Message from Laura Ordaz sent at 1/3/2023  1:55 PM EST -----  Regarding: Adderall  Contact: [phone]  So my insurance won’t cover the Vyvanse, I have called them to see what can be done and they sent over a prior authorization and if that doesn’t go through they asked why I wasn’t put on Methylphenidate instead. I’m sorry this is such a pain:/

## 2023-01-05 NOTE — TELEPHONE ENCOUNTER
I contacted insurance company and relayed the requested information, then faxed last office note to [phone].  Prior auth is pending.

## 2023-01-09 RX ORDER — METHYLPHENIDATE HYDROCHLORIDE 54 MG/1
54 TABLET ORAL EVERY MORNING
Qty: 15 TABLET | Refills: 0 | Status: SHIPPED | OUTPATIENT
Start: 2023-01-09 | End: 2023-02-07 | Stop reason: SDUPTHER

## 2023-01-09 NOTE — TELEPHONE ENCOUNTER
Sounds good, I sent in 2 weeks worth of 54 mg. If it is working then we can continue or adjust. KG

## 2023-01-09 NOTE — TELEPHONE ENCOUNTER
Insurance will not cover patient Vyvanse, what about starting Concerta 54 mg to start?  She has an appointment in the next 2 weeks and I can reevaluate.

## 2023-02-06 DIAGNOSIS — G47.09 OTHER INSOMNIA: ICD-10-CM

## 2023-02-07 RX ORDER — HYDROXYZINE PAMOATE 50 MG/1
50 CAPSULE ORAL DAILY PRN
Qty: 30 CAPSULE | Refills: 0 | Status: SHIPPED | OUTPATIENT
Start: 2023-02-07

## 2023-02-08 RX ORDER — METHYLPHENIDATE HYDROCHLORIDE 54 MG/1
54 TABLET ORAL EVERY MORNING
Qty: 15 TABLET | Refills: 0 | Status: SHIPPED | OUTPATIENT
Start: 2023-02-08 | End: 2023-02-18 | Stop reason: DRUGHIGH

## 2023-02-08 NOTE — TELEPHONE ENCOUNTER
Med refill, patient last saw normal APRN on 9/6/22.  UDS done 9/6/22, consent on 2/16/22.  Med last filled 1/9/23.

## 2023-02-15 ENCOUNTER — TELEPHONE (OUTPATIENT)
Dept: FAMILY MEDICINE CLINIC | Facility: CLINIC | Age: 38
End: 2023-02-15
Payer: COMMERCIAL

## 2023-02-15 NOTE — TELEPHONE ENCOUNTER
[facility] REP CALLED BOGDAN - SHE IS ASKING FOR SOME INFORMATION IN ORDER TO GET  methylphenidate (Concerta) 54 MG CR tablet   APPROVED FOR PT.  SHE NEEDS:  MOST RECENT OFFICE NOTE    PAST MEDICATIONS THAT PT HAS TRIED    DIAGNOSIS THAT IT IS BEING PRESCRIBED FOR   FAX # is [phone]    CASE REF # IS [id number]

## 2023-02-15 NOTE — TELEPHONE ENCOUNTER
Last 2 ON and hand written letter stating patient has not failed Adderall, she has done very well on it.  The issue is the national backorder that is preventing her from filling the Rx.

## 2023-02-17 NOTE — TELEPHONE ENCOUNTER
Methylphenidate 54mg is on back order right now.  The one they do have in stock is pended for you.

## 2023-02-18 RX ORDER — METHYLPHENIDATE HYDROCHLORIDE 50 MG/1
1 CAPSULE, EXTENDED RELEASE ORAL
Qty: 30 CAPSULE | Refills: 0 | Status: SHIPPED | OUTPATIENT
Start: 2023-02-18

## 2023-04-26 ENCOUNTER — TELEPHONE (OUTPATIENT)
Dept: FAMILY MEDICINE CLINIC | Facility: CLINIC | Age: 38
End: 2023-04-26
Payer: COMMERCIAL

## 2023-04-26 NOTE — TELEPHONE ENCOUNTER
Left several messages to get pt rescheduled with Leny, as she will be out of the office on May 9. Trying to reschedule pt for another day. Will send letter as well.

## 2023-06-27 PROBLEM — R91.1 PULMONARY NODULE: Status: ACTIVE | Noted: 2023-03-10

## 2023-06-27 PROBLEM — Z72.0 TOBACCO ABUSE: Status: ACTIVE | Noted: 2023-03-10

## 2023-06-27 PROBLEM — E66.01 MORBID OBESITY: Status: ACTIVE | Noted: 2023-03-10

## 2023-06-27 PROBLEM — F39 EPISODIC MOOD DISORDER: Status: ACTIVE | Noted: 2021-08-16

## 2023-06-27 PROBLEM — I26.99 BILATERAL PULMONARY EMBOLISM: Status: ACTIVE | Noted: 2023-03-10

## 2023-09-30 DIAGNOSIS — F90.0 ATTENTION DEFICIT HYPERACTIVITY DISORDER (ADHD), PREDOMINANTLY INATTENTIVE TYPE: ICD-10-CM

## 2023-10-02 RX ORDER — ATOMOXETINE 40 MG/1
40 CAPSULE ORAL DAILY
Qty: 30 CAPSULE | Refills: 0 | Status: SHIPPED | OUTPATIENT
Start: 2023-10-02

## 2023-10-02 NOTE — TELEPHONE ENCOUNTER
I have refilled for 30 days, please contact patient as she needs office visit to see how she is doing on medication, it can be telehealth.

## 2024-02-07 RX ORDER — HYDROXYZINE PAMOATE 25 MG/1
25 CAPSULE ORAL 3 TIMES DAILY PRN
Qty: 60 CAPSULE | Refills: 0 | OUTPATIENT
Start: 2024-02-07

## 2024-08-05 DIAGNOSIS — G47.09 OTHER INSOMNIA: ICD-10-CM

## 2024-08-05 RX ORDER — HYDROXYZINE PAMOATE 50 MG/1
CAPSULE ORAL
Qty: 30 CAPSULE | Refills: 0 | OUTPATIENT
Start: 2024-08-05

## 2025-05-19 NOTE — TELEPHONE ENCOUNTER
05/19/25 9:10 AM     Chart reviewed for [facility] Visits was/were not submitted to the patient's insurance.     Belinda Verma MA   [facility]   LM on patient VM